# Patient Record
Sex: FEMALE | Race: WHITE | Employment: FULL TIME | URBAN - METROPOLITAN AREA
[De-identification: names, ages, dates, MRNs, and addresses within clinical notes are randomized per-mention and may not be internally consistent; named-entity substitution may affect disease eponyms.]

---

## 2017-02-14 ENCOUNTER — TELEPHONE (OUTPATIENT)
Dept: FAMILY MEDICINE | Facility: CLINIC | Age: 37
End: 2017-02-14

## 2017-02-14 NOTE — TELEPHONE ENCOUNTER
Summary:    Patient is due/failing the following:   FOLLOW UP    Action needed:   Patient needs office visit for diabetic follow up .    Type of outreach:    phone no answer or voicemail  Reminder letter sent.  Questions for provider review:    None                                                                                                                                    Breana Santamaria       Chart routed to Care Team .        Panel Management Review      Patient has the following on her problem list:     Diabetes    ASA:     Last A1C  Lab Results   Component Value Date    A1C 8.6 12/21/2016    A1C 7.1 07/01/2016    A1C 7.9 04/05/2016    A1C 6.2 09/30/2015    A1C 6.4 06/12/2015     A1C tested: Failed    Last LDL:    Lab Results   Component Value Date    CHOL 152 07/01/2016     Lab Results   Component Value Date    HDL 43 07/01/2016     Lab Results   Component Value Date    LDL 71 07/01/2016     Lab Results   Component Value Date    TRIG 188 07/01/2016     Lab Results   Component Value Date    CHOLHDLRATIO 3.7 09/30/2015     Lab Results   Component Value Date    NHDL 109 07/01/2016       Is the patient on a Statin? YES             Is the patient on Aspirin? NO    Medications     HMG CoA Reductase Inhibitors    simvastatin (ZOCOR) 40 MG tablet          Last three blood pressure readings:  BP Readings from Last 3 Encounters:   12/21/16 116/72   10/14/16 116/76   09/06/16 119/81            Tobacco History:     History   Smoking Status     Former Smoker     Years: 1.00     Types: Cigarettes     Start date: 1/1/1989     Quit date: 1/1/1990   Smokeless Tobacco     Never Used           Composite cancer screening  Chart review shows that this patient is due/due soon for the following None

## 2017-02-14 NOTE — LETTER
[facility]  [address], [address]  [place] [place] [address]  Phone: [phone]  Fax: [phone]  February 14, 2017      Mark Quick  [address] [address]      Dear Mark,    We care about your health and have reviewed your health plan including your medical conditions, medications, and lab results.  Based on this review, it is recommended that you follow up regarding the following health topic(s):  -Diabetes    We recommend you take the following action(s):  -schedule a FOLLOWUP APPOINTMENT.     Please call us at the [facility] - [phone] (or use [place]) to address the above recommendations.     Thank you for trusting [facility] and we appreciate the opportunity to serve you.  We look forward to supporting your healthcare needs in the future.    Healthy Regards,    Your Health Care Team  [facility]

## 2017-02-20 ENCOUNTER — TELEPHONE (OUTPATIENT)
Dept: FAMILY MEDICINE | Facility: CLINIC | Age: 37
End: 2017-02-20

## 2017-02-20 DIAGNOSIS — E11.9 TYPE 2 DIABETES MELLITUS WITH HEMOGLOBIN A1C GOAL OF LESS THAN 7.0% (H): Chronic | ICD-10-CM

## 2017-02-20 NOTE — TELEPHONE ENCOUNTER
Trulicity 1.5mg         Last Written Prescription Date: 12/15/2016  Last Fill Quantity: 2ml, # refills: 1  Last Office Visit with G, P or [facility] prescribing provider:  12/21/2016        BP Readings from Last 3 Encounters:   12/21/16 116/72   10/14/16 116/76   09/06/16 119/81     Lab Results   Component Value Date    MICROL 6 04/05/2016     No results found for: MICROALBUMIN  Creatinine   Date Value Ref Range Status   07/01/2016 0.64 0.52 - 1.04 mg/dL Final   ]  GFR Estimate   Date Value Ref Range Status   07/01/2016 >90  Non  GFR Calc   >60 mL/min/1.7m2 Final   05/20/2016 >60 ml/min/1.73m2 Final   04/05/2016 >90  Non  GFR Calc   >60 mL/min/1.7m2 Final     GFR Estimate If Black   Date Value Ref Range Status   07/01/2016 >90   GFR Calc   >60 mL/min/1.7m2 Final   05/20/2016 >60 ml/min/1.73m2 Final   04/05/2016 >90   GFR Calc   >60 mL/min/1.7m2 Final     Lab Results   Component Value Date    CHOL 152 07/01/2016     Lab Results   Component Value Date    HDL 43 07/01/2016     Lab Results   Component Value Date    LDL 71 07/01/2016     Lab Results   Component Value Date    TRIG 188 07/01/2016     Lab Results   Component Value Date    CHOLHDLRATIO 3.7 09/30/2015     Lab Results   Component Value Date    AST 17 07/01/2016     Lab Results   Component Value Date    ALT 23 07/01/2016     Lab Results   Component Value Date    A1C 8.6 12/21/2016    A1C 7.1 07/01/2016    A1C 7.9 04/05/2016    A1C 6.2 09/30/2015    A1C 6.4 06/12/2015     Potassium   Date Value Ref Range Status   07/01/2016 4.0 3.4 - 5.3 mmol/L Final

## 2017-02-21 RX ORDER — DULAGLUTIDE 1.5 MG/.5ML
INJECTION, SOLUTION SUBCUTANEOUS
Qty: 2 ML | Refills: 1 | Status: SHIPPED | OUTPATIENT
Start: 2017-02-21 | End: 2017-05-08

## 2017-03-09 ENCOUNTER — TELEPHONE (OUTPATIENT)
Dept: FAMILY MEDICINE | Facility: CLINIC | Age: 37
End: 2017-03-09

## 2017-03-09 NOTE — TELEPHONE ENCOUNTER
Summary:    Patient is due/failing the following:   Eye and foot exam, A1C and FOLLOW UP    Action needed:   Patient needs office visit for diabetic follow up. and Patient needs non-fasting lab only appointment    Type of outreach:    Phone, left message for patient to call back.     Questions for provider review:    None                                                                                                                                    Breana Mary Bethayla       Chart routed to Care Team .        Panel Management Review      Patient has the following on her problem list:     Diabetes    ASA:     Last A1C  Lab Results   Component Value Date    A1C 8.6 12/21/2016    A1C 7.1 07/01/2016    A1C 7.9 04/05/2016    A1C 6.2 09/30/2015    A1C 6.4 06/12/2015     A1C tested: FAILED    Last LDL:    Lab Results   Component Value Date    CHOL 152 07/01/2016     Lab Results   Component Value Date    HDL 43 07/01/2016     Lab Results   Component Value Date    LDL 71 07/01/2016     Lab Results   Component Value Date    TRIG 188 07/01/2016     Lab Results   Component Value Date    CHOLHDLRATIO 3.7 09/30/2015     Lab Results   Component Value Date    NHDL 109 07/01/2016       Is the patient on a Statin? YES             Is the patient on Aspirin? NO    Medications     HMG CoA Reductase Inhibitors    simvastatin (ZOCOR) 40 MG tablet          Last three blood pressure readings:  BP Readings from Last 3 Encounters:   12/21/16 116/72   10/14/16 116/76   09/06/16 119/81            Tobacco History:     History   Smoking Status     Former Smoker     Years: 1.00     Types: Cigarettes     Start date: 1/1/1989     Quit date: 1/1/1990   Smokeless Tobacco     Never Used           Composite cancer screening  Chart review shows that this patient is due/due soon for the following None

## 2017-03-09 NOTE — LETTER
[facility]  [address]  [place] [address]  Phone: [phone]  Fax: [phone]  April 4, 2017      Mark Quick  [address] [address] [address]      Dear Mark,    We care about your health and have reviewed your health plan including your medical conditions, medications, and lab results.  Based on this review, it is recommended that you follow up regarding the following health topic(s):  -Diabetes    We recommend you take the following action(s):  -schedule a FOLLOWUP OFFICE APPOINTMENT.  We will perform the following labs:  A1c.     Please call us at the [facility] - [phone] (or use [facility]: [place]) to address the above recommendations.     Thank you for trusting [facility] and we appreciate the opportunity to serve you.  We look forward to supporting your healthcare needs in the future.    Healthy Regards,    Your Health Care Team  [facility]

## 2017-03-28 ENCOUNTER — OFFICE VISIT (OUTPATIENT)
Dept: PODIATRY | Facility: OTHER | Age: 37
End: 2017-03-28
Payer: COMMERCIAL

## 2017-03-28 VITALS — HEIGHT: 60 IN | WEIGHT: 205 LBS | BODY MASS INDEX: 40.25 KG/M2 | TEMPERATURE: 98.5 F

## 2017-03-28 DIAGNOSIS — E11.9 TYPE 2 DIABETES MELLITUS WITH HEMOGLOBIN A1C GOAL OF LESS THAN 7.0% (H): Chronic | ICD-10-CM

## 2017-03-28 DIAGNOSIS — M72.2 PLANTAR FASCIAL FIBROMATOSIS: Primary | ICD-10-CM

## 2017-03-28 PROCEDURE — 99204 OFFICE O/P NEW MOD 45 MIN: CPT | Performed by: PODIATRIST

## 2017-03-28 RX ORDER — DICLOFENAC SODIUM 75 MG/1
75 TABLET, DELAYED RELEASE ORAL 2 TIMES DAILY
Qty: 60 TABLET | Refills: 1 | Status: SHIPPED | OUTPATIENT
Start: 2017-03-28 | End: 2017-05-08

## 2017-03-28 ASSESSMENT — PAIN SCALES - GENERAL: PAINLEVEL: MILD PAIN (3)

## 2017-03-28 NOTE — LETTER
3/28/2017       RE: Mark Quick  [address] [address] [facility] [address]           Dear Colleague,    Thank you for referring your patient, Mark Quick, to the [facility]. Please see a copy of my visit note below.    HPI:  Mark Quick is a 36 year old female who is seen in consultation at the request of self.    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     ROS: 10 point ROS neg other than the symptoms noted above in the HPI.    PAST MEDICAL HISTORY:   Past Medical History:   Diagnosis Date     Gestational diabetes , ,      History of blood transfusion     I was given blood and plasma after giving birth     Preeclampsia, severe     elevated LFTs     PAST SURGICAL HISTORY:   Past Surgical History:   Procedure Laterality Date      SECTION  , , ,      TONSILLECTOMY       MEDICATIONS:   Current Outpatient Prescriptions:      diclofenac (VOLTAREN) 75 MG EC tablet, Take 1 tablet (75 mg) by mouth 2 times daily, Disp: 60 tablet, Rfl: 1     TRULICITY 1.5 MG/0.5ML pen, INJECT 1.5 MG UNDER THE SKIN EVERY 7 DAYS, Disp: 2 mL, Rfl: 1     Pediatric Multivit-Minerals-C (FLINTSTONES COMPLETE PO), , Disp: , Rfl:      Calcium Citrate-Vitamin D (CALCIUM + D PO), , Disp: , Rfl:      nystatin-triamcinolone (MYCOLOG II) cream, Apply to affected area BID up to 7 days prn rash, Disp: 30 g, Rfl: 3     blood glucose monitoring (NO BRAND SPECIFIED) test strip, Use to test blood sugar four times daily or as directed. One touch  ultra mini system, Disp: 12 Box, Rfl: 4     citalopram (CELEXA) 40 MG tablet, Take 1 tablet (40 mg) by mouth daily, Disp: 90 tablet, Rfl: 3     simvastatin (ZOCOR) 40 MG tablet, Take 1 tablet (40 mg) by mouth At Bedtime, Disp: 90 tablet, Rfl: 3     ASPIRIN NOT PRESCRIBED (INTENTIONAL), continuous prn for other Antiplatelet medication not prescribed intentionally due to Not indicated based on age, Disp: , Rfl:      lisinopril (PRINIVIL,ZESTRIL) 5 MG tablet, Take 1 tablet (5 mg) by mouth daily, Disp: 90 tablet, Rfl: 3     blood glucose meter (NO BRAND SPECIFIED) meter device kit, Use to test blood sugar four times daily or as directed.  Get meter that is covered by insurance, Disp: 1 kit, Rfl: 0     IBUPROFEN PO, , Disp: , Rfl:   ALLERGIES:    Allergies   Allergen Reactions     Hydrocodone Nausea     If taking Zofran she can take it.       Percocet [Oxycodone-Acetaminophen] Nausea     If using zofran she can tolerate but prefers not to.       SOCIAL HISTORY:   Social History     Social History     Marital status:      Spouse name: Jeff     Number of children: 4     Years of education: 13     Occupational History     [employer] and  [employer]     Social History Main Topics     Smoking status: Former Smoker     Years: 1.00     Types: Cigarettes     Start date: 1/1/1989     Quit date: 1/1/1990     Smokeless tobacco: Never Used     Alcohol use Yes      Comment: Very infrequent     Drug use: No     Sexual activity: Yes     Partners: Male     Birth control/ protection: Female Surgical      Comment: tubal     Other Topics Concern     Parent/Sibling W/ Cabg, Mi Or Angioplasty Before 65f 55m? No     Social History Narrative     FAMILY HISTORY:   Family History   Problem Relation Age of Onset     DIABETES Mother      DIABETES Maternal Grandmother      HEART DISEASE Maternal Grandfather      Asthma No family hx of      C.A.D. No family hx of      Hypertension No family hx of      CEREBROVASCULAR DISEASE No family hx of   "    Breast Cancer No family hx of      Cancer - colorectal No family hx of      Prostate Cancer No family hx of      Alcohol/Drug No family hx of      Allergies No family hx of      Alzheimer Disease No family hx of      Anesthesia Reaction No family hx of      Arthritis No family hx of      Blood Disease No family hx of      CANCER No family hx of      Cardiovascular No family hx of      Circulatory No family hx of      Congenital Anomalies No family hx of      Connective Tissue Disorder No family hx of      Depression No family hx of      Endocrine Disease No family hx of      Eye Disorder No family hx of      Coronary Artery Disease No family hx of      Hyperlipidemia No family hx of      Ovarian Cancer No family hx of      Depression/Anxiety No family hx of      Thyroid Disease No family hx of      OSTEOPOROSIS No family hx of      Chemical Addiction No family hx of      Known Genetic Syndrome No family hx of      MENTAL ILLNESS No family hx of      Anxiety Disorder No family hx of        EXAM:Vitals: Temp 98.5  F (36.9  C) (Temporal)  Ht 5' 0.43\" (1.535 m)  Wt 205 lb (93 kg)  BMI 39.46 kg/m2  BMI= Body mass index is 39.46 kg/(m^2).    General appearance: Patient is alert and fully cooperative with history & exam.  No sign of distress is noted during the visit.     Psychiatric: Affect is pleasant & appropriate.  Patient appears motivated to improve health.     Respiratory: Breathing is regular & unlabored while sitting.     HEENT: Hearing is intact to spoken word.  Speech is clear.  No gross evidence of visual impairment that would impact ambulation.     Vascular: DP & PT 2/4 & regular bilaterally.  No significant edema, rubor or varicosities noted.  CFT and skin temperature is normal to both lower extremities.       Neurologic: Lower extremity sensation is intact to light touch.  No evidence of weakness in the lower extremities.  Protective threshold is intact +10/10 applications of a 5.0 7 monofilament.   "   Dermatologic: Skin is intact to both lower extremities without significant lesions, rash or abrasion.  Normal texture turgor and tone. No paronychia or evidence of soft tissue infection is noted.    Musculoskeletal: Patient is ambulatory without assistive device or brace. Pain is noted with firm palpation along the medial band of the plantar fascia bilateral foot most notably at the origination upon the calcaneus not through the arch.  No pain with compression of the calcaneus medial to lateral or with palpation of the achilles, peroneal or posterior tibial tendons.  Slightly more than 0  of ankle joint dorsiflexion without crepitus or pain throughout the ankle, subtalar or midtarsal joints.  No pain or limitations throughout manual muscle strength testing plus 5/5 to all four quadrants bilateral.  No palpable edema noted.      Radiographs:  Osteophyte noted about the plantar calcaneus consistent with plantar fasciitis.    Hemoglobin A1C (%)   Date Value   12/21/2016 8.6 (H)   07/01/2016 7.1 (H)   04/05/2016 7.9 (H)   09/30/2015 6.2 (H)   06/12/2015 6.4 (H)   01/29/2015 6.0   11/04/2014 8.7 (H)   11/30/2011 5.3     Creatinine (mg/dL)   Date Value   07/01/2016 0.64   05/20/2016 0.69   04/05/2016 0.58   09/30/2015 0.65   06/12/2015 0.89   01/29/2015 0.74          ASSESSMENT:       ICD-10-CM    1. Plantar fascial fibromatosis M72.2 ORTHOTICS REFERRAL     diclofenac (VOLTAREN) 75 MG EC tablet   2. Type 2 diabetes mellitus with hemoglobin A1c goal of less than 7.0% (H) E11.9 ORTHOTICS REFERRAL     diclofenac (VOLTAREN) 75 MG EC tablet       PLAN:  Reviewed patient's chart in [facility] and discussed etiology and treatment options.      Treatments:  3/28/2017  Discontinue barefoot walking or unsupported walking in shoes without shank.  Dispensed written instructions to obtain appropriate shoe gear and/or OTC inserts.  Dispensed anterior night splint to use all night every night.  Prescription oral Voltaren for a short course.  Discussed risks.  Prescription for custom molded orthotics 3/28/2017  Follow up in 4-5 weeks     Also discussed uncontrolled diabetes. We discussed how elevated blood glucose contributes to chronic collagen cross-linking and increased risk of such things related to plantar fasciitis. I recommen recent reduced control of diabetes. She is also newly diagnosed in the last couple of years and will benefit from more aggressive education and understanding as well as medication adjustments. ded that she follow-up with her primary care provider for more aggressive management of blood glucose. Written instructions regarding lower extremity risk factors associated with diabetes also dispensed.    In addition to the above history and physical exam, approximately 45 minutes of time was spent with the patient, greater than 50% directly with the patient, counseling, educating, and coordinating care in regards to the above noted multiple diagnoses.     Karl Basilio DPM      Again, thank you for allowing me to participate in the care of your patient.        Sincerely,              Karl Basilio DPM

## 2017-03-28 NOTE — MR AVS SNAPSHOT
After Visit Summary   3/28/2017    Mark Quick    MRN: [id number]           Patient Information     Date Of Birth          1980        Visit Information        Provider Department      3/28/2017 11:00 AM Karl Basilio, NIR [facility]'s Diagnoses     Plantar fascial fibromatosis    -  1    Type 2 diabetes mellitus with hemoglobin A1c goal of less than 7.0% (H)          Care Instructions      Reliable shoe stores: To maximize your experience and provide the best possible fit.  Be sure to show them your foot concerns and tell them Dr. Basilio sent you.      Stores listed in bold have only athletic shoes, and stores that are not bold are mostly casual or variety of shoes    [employer]  [address] [place] [address]  [phone]    [employer]  [address] [place] [address]  [phone]    [employer]  [address] [place] [address]  [phone]    [employer]  [address] [facility] [address]  [phone]    [employer]  [address] [place], [place] [address] [phone]    [employer]  [address] [place] [address]  [phone]                         [employer] Locations:     [address] [place] [address]   [phone]     [address] [place] [address]   [phone]     [address] [place] [address]   [phone]     [address] [place] [address]   [phone]     [address] [place] [address]   [phone]     [address] [facility] [place] [address]   [phone]     [address] [facility] [address] [facility] [address]   [place] [address]   [phone]     [address]   [place], [place] [address] [phone]             How to find reasonable shoes          The correct width    Correct Fitting    Correct Length      Foot Distortion    Posture Distortion                           Torsional Rigidity      Grasp behind the heel and underneath the foot and twist      Bad    Excessive torsion/twist in midfoot     Less torsion/twist in midfoot is better                   Heel Counter Rigidity      Grasp just above   midsole and squeeze      Bad    Soft heel counter      Good    Rigid Heel Counter      Flexion Rigidity      Grasp shoe and bend from forefoot to rearfoot                    PLANTAR FASCIITIS  The  plantar fascia  is a thick fibrous layer of tissue that covers the bones on the bottom of your foot. It supports the foot bones in an arched position.  Plantar fasciitis  is a painful inflammation of the plantar fascia due to overuse. This can develop gradually or suddenly. It usually affects one foot at a time but can affect both feet. Heel pain can be sharp and feel like a knife sticking in the bottom of your foot. Pain may occur after exercising, long distance jogging, stair climbing, long periods of standing, or after getting up from a seated position.  Risk factors include arthritis, diabetes, obesity or recent weight gain, flat-foot, high arch, wearing high heels or loose shoes or shoes with poor arch support.  Sudden changes in activity or shoe gear may contribute to symptoms.  Foot pain from this condition is usually worse in the morning and improves with walking. By the end of the day there may be a dull aching. Treatment requires improved support of feet, short-term rest and controlling inflammation. It may take up to nine months before all symptoms go away with the measures described below.  A steroid injection into the foot, or surgery may be needed if this is becomes long standing or severe.  HOME CARE  1. If you are overweight, lose weight to promote healing.  2. Choose supportive shoes (stiff through the shank) with good arch support and shock absorbency. Replace athletic shoes when they become worn out. Don t walk or run barefoot.  3. Shoe inserts are an important part of  treatment. You can buy off-the-shelf shoe inserts inexpensively such as Superfeet.  The best ones are custom molded to your foot with a prescription.  4. Night splints keep the plantar fascia gently stretched while you sleep and will eliminate morning pain. Wear it ALL NIGHT EVERY NIGHT, or any time you sit for a long time.  5. Reduce by 10% or more the activities that stress the feet: jogging, prolonged standing or walking, high impact sports, etc.  6. Stretch your feet. Gently flex your ankle by leaning into a wall or counter or drop your heel from a step.  Stretch two minutes of every hour you are awake.  7. Icing or massage may help heel pain. Apply an ice pack or frozen water or Coke bottle to the heel for 10-20 minutes as a preventive or after an acute flare of symptoms. You may repeat this as needed.   Follow up with your Doctor in 3 weeks as instructed.      DIABETES AND YOUR FEET    What effect does diabetes have on the feet?  Diabetes can result in several problems in the feet including contractures of the tendons leading to deformities and reduced function of the bones, skin ulcers or open sores on pressure points or prominent deformities, reduced sensation, reduced blood flow and thus reduced oxygen and immune cells to the tiny vessels in our feet. This all leads to higher risk of hospitalization, infections, and amputations.     What is neuropathy?  Neuropathy is a term used to describe a loss of nerve function.  Patients with diabetes are at risk of developing neuropathy if their sugars continue to run high and are above the normal value of 140.  The elevated blood sugar in the body enters the nerves causing it to swell and impair nerve function.  The higher the blood sugar and the longer it is elevated, the more damage is done to nerves.  This damage is permanent and irreversible.  These damaged sensory nerves can then cause reduced feeling or cause pain.  Damaged motor nerves can reduce blood flow  "and white blood cells into into your foot, skin and bones reducing your ability to heal a small problem. And neuropathy can cause tendons to become unbalanced and contribute to the formation of deformity and contractures in our feet. Often times, neuropathy can be prevented by controlling your blood sugar.  Your risk of developing neuropathy goes up dramatically as your hemoglobin A1C raises above 7.5.      How do I know if I have neuropathy?  When a person develops neuropathy, they usually begin to feel numbness or tingling in their feet and sometimes in their legs.  Other symptoms may include painful burning or hot feet, tingling, electrical sensations or feeling like insects or ants are crawling on your feet or legs.  If blood sugar remains above 140  for long periods of time, neuropathy can also occur in the hands.  When a person loses their \"protective threshold\" or ability to detect a 5.07 [place] monofilament is when they have elevated risk for developing foot deformity, contractures, foot infections, amputations, Charcot arthropathy, or other complications. Keep your hemoglobin A1C below 7.5 to reduce this risk.    What is vascular disease?  Peripheral vascular disease is a term used to describe a loss or decrease in circulation (blood flow).  There is a problem in getting blood, immune cells, and oxygen to areas that need it.  Similar to neuropathy, sugars can build up in the walls of the arteries (blood vessels) and cause them to become swollen, thickened and hardened.  This decreases the amount of blood that can go to an area that needs it.  Though this is common in the legs of diabetic patients, it can also affect other arteries (blood vessels) in the body such as in the heart, kidney, eyes, and the blood flow into bones.  It is often seen first in the small vessels of her body notably our feet and toes.    How do I know if I have vascular disease?  In the legs, vascular disease usually results " in cramping.  Patients who develop leg cramps after walking the same distance every time (i.e. One block, half a mile, ect.) need to let their doctors know so that their circulation may be checked.  Cramps causing severe pain in the feet and/or legs while sleeping and the cramps go away when you stand or hang your legs off the side of the bed, may also be a sign of poor blood circulation.  Occasional cramping in cold weather or on rare occasions with activity may not be due to poor circulation, but you should inform your doctor.    How can these problems be prevented?  The key to prevention is good blood sugar control all day every day.  Inadequate blood sugar control is the most common way patients experience these problems. Reducing, controlling and measuring your daily consumption of sugar or carbohydrates is essential to understanding and managing diabetes.  Physical activity (exercise) is a very good way to help decrease your blood sugars.  Exercise can lower your blood sugar, blood pressure, and cholesterol.  It also reduces your risk for heart disease and stroke, relieves stress, and strengthens your heart, muscles and bones. Physical activity also increases your balance and reduces development of contractures and foot deformities over time. In addition, regular activity helps insulin work better, improves your blood circulation, and keeps your joints flexible.  If you're trying to lose weight, a combination of exercise and wise food choices can help you reach your target weight and maintain it.  Activity and exercise alone can not make up for poor diet choices, eating too much, or eating too many sugars or carbohydrates.  Ask your doctor for help when you are not meeting your blood sugar goals. Changes or increases in medication are powerful tools in reducing your blood sugar.    Know your blood sugar and hemoglobin A1C trend.  Upon first diagnosis or during acute illness, checking your blood sugar 4 times a  day can help you understand how your diet, activity, and lifestyle affect your blood sugar.  Monitoring your hemoglobin A1C can help you understand how well you are managing blood sugar over the long run.  Your hemoglobin A1C tells you what your blood sugar averages all day, every day, over the past 90 days.       To experience the lowest risk of complications associated with diabetes such as neuropathy, loss of blood flow, bone or joint infection, charcot arthropathy, or amputation, the American Diabetes Association recommends a target hemoglobin A1C of less than 7.0%, while the American Association of Clinical Endocrinologists' recommendation is 6.5% or less.  Both organizations advise that the goals be individualized based on patient factors such as other health conditions, history of hypoglycemia, education, and life expectancy.  A patients risk of experiencing complications associated with diabetes is only slightly elevated with a hemoglobin A1C above 6.0.  However, this risk goes up exponentially when the hemoglobin A1C is above 7.5.  The longer the hemoglobin A1C is elevated, the more risk that patient will experience in their lifetime. The damage that occurs to nerves, blood vessels, tendons, bones and body organs, while their hemoglobin A1C is elevated is mostly irreversible and worsens with each additional time period of elevated hemoglobin A1C.     You must understand and manage your disease.  Your health insurance or medical team cannot manage this disease for you.  When you take responsibility for understanding and managing your disease, you can expect to experience fewer problems associated with diabetes in your lifetime.  You will  Also experience a higher quality of life and health and reduced cost of health care.    Diabetic Foot Care Recommendations  The following are recommendations for avoiding serious foot problems or injury    DO'S  1. Be aware of your hemoglobin A1C and continue to follow up  with your medical team for adjustments in your lifestyle and medication until your reach your A1C goal.  Keep this below 7.5 to reduce your risk of developing complications associated with diabetes.    2.  Wash your feet with lukewarm water and a mild soap and then dry them thoroughly, especially between the toes.  Gently floss your towel or washcloth between each toe at every bath.  Soaking your feet in water cannot clean dead skin, debris, and bacteria from your feet and is not necessary.   3. Examine your feet daily looking for cuts, corns, blisters, cracks, ect..., especially after wearing new shoes or increased or changed activities.  Make sure to look between your toes.  If you cannot see the bottom of your feet, set a mirror on the floor and hold your foot over it, or ask a family member to examine your feet for you daily.  Contact your doctor immediately if new problems are noted or if sores are not healing.  4.  Immediately apply moisturizer cream such as Cetaphil to the tops and bottoms of your feet, avoiding areas between the toes.  Apply sunscreen or cover your feet if they will be exposed to extended sunlight.  5.Use clean comfortable shoes.  Socks should not have thick seams or cut off the circulation around the leg.  Break in new shoes slowly and rotate with older shoes until broken in.  Check the inside of your shoes with your hand to look for areas of irritation or objects that may have fallen into your shoes.    6. Keep slippers by the side of your bed for use during the night.  7. Shoes should be fitted by a professional and should not cause areas of irritation.  Check your feet regularly when wearing a new pair of shoes and replace them as needed.  8.  Talk to your doctor about proper exercise.  Exercise and stretching stimulate blood flow to your feet and maintain proper glucose levels.  Use it or lose it!  9.  Monitor your blood glucose level and your hemoglobin A1C.  Notify your doctor  "immediately if your blood sugar is abnormally high or low.  10.  Cut your nails straight across, but then gently round any sharp edges with a nail file.  If you have neuropathy, peripheral vascular disease or cannot see that well to trim your own toenails, see a medical professional for care.    DONT'S  1.  Do not soak your feet if you have an open sore or your provider has informed you that you have neuropathy or loss of protective threshold.  Use only lukewarm water and always check the temperature with your hand as hot water can easily burn your feet.    2.  Never use a hot water bottle or heating pad on your feet.  Also do not apply hot or cold compresses to your feet.  With decreased sensation, you could burn or freeze your feet.  Do not rest your feet near a heat source such as a heater or heat register.    3.  Do not apply any of these to your feet:    - over the counter medicine for corns or warts    -  Harsh chemicals like boric acid    -  Do not self-treat corns, cuts, blisters or infections.  Always consult your doctor.   4.  Do not wear sandals, slippers or walk barefoot, especially on harsh surfaces.  5.  If you smoke, stop!!!            Follow-ups after your visit        Additional Services     ORTHOTICS REFERRAL       [facility] scheduling staff may contact patient to arrange appointments for casting of orthotics and often do not leave messages.  The patient may call this number for scheduling at their convenience. Scheduling Phone [phone].      One pair custom functional foot orthotics.   Flexible polypropylene shell with 1/8\" Spenco cushioned top cover, crepe rearfoot post, medial density arch fill, MIRELLA bottom cover.  Aerobic model.                  Your next 10 appointments already scheduled     Mar 28, 2017  3:30 PM CDT   Weight Loss Visit with Nela Ritter Diet 3, RD   [facility] ([facility])    [address] [place] [address] " "[phone]              Who to contact     If you have questions or need follow up information about today's clinic visit or your schedule please contact [facility] [place] directly at [phone].  Normal or non-critical lab and imaging results will be communicated to you by readeohart, letter or phone within 4 business days after the clinic has received the results. If you do not hear from us within 7 days, please contact the clinic through readeohart or phone. If you have a critical or abnormal lab result, we will notify you by phone as soon as possible.  Submit refill requests through [employer] or call your pharmacy and they will forward the refill request to us. Please allow 3 business days for your refill to be completed.          Additional Information About Your Visit        [employer]ohar[employer] Information     [employer] gives you secure access to your electronic health record. If you see a primary care provider, you can also send messages to your care team and make appointments. If you have questions, please call your primary care clinic.  If you do not have a primary care provider, please call [phone] and they will assist you.        Care EveryWhere ID     This is your Care EveryWhere ID. This could be used by other organizations to access your [place] medical records  [id number]        Your Vitals Were     Temperature Height BMI (Body Mass Index)             98.5  F (36.9  C) (Temporal) 5' 0.43\" (1.535 m) 39.46 kg/m2          Blood Pressure from Last 3 Encounters:   12/21/16 116/72   10/14/16 116/76   09/06/16 119/81    Weight from Last 3 Encounters:   03/28/17 205 lb (93 kg)   12/21/16 210 lb 9.6 oz (95.5 kg)   10/14/16 205 lb 6.4 oz (93.2 kg)              We Performed the Following     ORTHOTICS REFERRAL          Today's Medication Changes          These changes are accurate as of: 3/28/17 11:52 AM.  If you have any questions, ask your nurse or doctor.               Start taking these " medicines.        Dose/Directions    diclofenac 75 MG EC tablet   Commonly known as:  VOLTAREN   Used for:  Plantar fascial fibromatosis, Type 2 diabetes mellitus with hemoglobin A1c goal of less than 7.0% (H)   Started by:  Karl Basilio DPM        Dose:  75 mg   Take 1 tablet (75 mg) by mouth 2 times daily   Quantity:  60 tablet   Refills:  1            Where to get your medicines      These medications were sent to [place] [address] [place] [facility] - [address]  [address] [facility] [address]     Phone:  [phone]     diclofenac 75 MG EC tablet                Primary Care Provider Office Phone # Fax #    Adriana OBEY [facility] [phone]       [facility] [address] [facility] [address]        Thank you!     Thank you for choosing [facility]  for your care. Our goal is always to provide you with excellent care. Hearing back from our patients is one way we can continue to improve our services. Please take a few minutes to complete the written survey that you may receive in the mail after your visit with us. Thank you!             Your Updated Medication List - Protect others around you: Learn how to safely use, store and throw away your medicines at www.disposemymeds.org.          This list is accurate as of: 3/28/17 11:52 AM.  Always use your most recent med list.                   Brand Name Dispense Instructions for use    ASPIRIN NOT PRESCRIBED    INTENTIONAL     continuous prn for other Antiplatelet medication not prescribed intentionally due to Not indicated based on age       blood glucose monitoring meter device kit    no brand specified    1 kit    Use to test blood sugar four times daily or as directed.  Get meter that is covered by insurance       blood glucose monitoring test strip    no brand specified    12 Box    Use to test blood sugar four times daily or as directed. One touch ultra mini system       CALCIUM + D PO           citalopram 40 MG tablet    celeXA    90 tablet    Take 1 tablet (40 mg) by mouth daily       diclofenac 75 MG EC tablet    VOLTAREN    60 tablet    Take 1 tablet (75 mg) by mouth 2 times daily       FLINTSTONES COMPLETE PO          IBUPROFEN PO          lisinopril 5 MG tablet    PRINIVIL/ZESTRIL    90 tablet    Take 1 tablet (5 mg) by mouth daily       nystatin-triamcinolone cream    MYCOLOG II    30 g    Apply to affected area BID up to 7 days prn rash       simvastatin 40 MG tablet    ZOCOR    90 tablet    Take 1 tablet (40 mg) by mouth At Bedtime       TRULICITY 1.5 MG/0.5ML pen   Generic drug:  dulaglutide     2 mL    INJECT 1.5 MG UNDER THE SKIN EVERY 7 DAYS

## 2017-03-28 NOTE — NURSING NOTE
"Chief Complaint   Patient presents with     Consult     DM - exam, B/L heel pain > Right, onset early 2016; new patient     Diabetes     Type 2       Initial Temp 98.5  F (36.9  C) (Temporal)  Ht 5' 0.43\" (1.535 m)  Wt 205 lb (93 kg)  BMI 39.46 kg/m2 Estimated body mass index is 39.46 kg/(m^2) as calculated from the following:    Height as of this encounter: 5' 0.43\" (1.535 m).    Weight as of this encounter: 205 lb (93 kg).  BP completed using cuff size: NA (Not Taken)  Medication Reconciliation: complete    Virginia Reyna CMA, March 28, 2017    "

## 2017-03-28 NOTE — PATIENT INSTRUCTIONS
Reliable shoe stores: To maximize your experience and provide the best possible fit.  Be sure to show them your foot concerns and tell them Dr. Basilio sent you.      Stores listed in bold have only athletic shoes, and stores that are not bold are mostly casual or variety of shoes    [employer]  [address] [place] [address]  [phone]    [facility] [employer] [facility]  [address] [place] [address]  [phone]     [employer] [facility]  [address] [place] [address]  [phone]    [employer]  [address] [facility]  [facility] [address]  [phone]    [employer]  [address] [place], [place] [address] [phone]    [employer]  [address] [place] [address]  [phone]                         [employer] Locations:     [address] [place] [address]   [phone]     [address] [place] [address] [address] [place] [address]   [phone]     [address] [place] [address]   [phone]     [address] [facility] [place] [address]   [phone]     [address] [place] [address]   [phone]     [address] [place] [address]   [phone]     [address] [facility] [address]   [place] [address]   [phone]     [address] [facility] [address]   [place] [address]   [phone]             How to find reasonable shoes          The correct width    Correct Fitting    Correct Length      Foot Distortion    Posture Distortion                          Torsional Rigidity      Grasp behind the heel and underneath the foot and twist      Bad    Excessive torsion/twist in midfoot     Less torsion/twist in midfoot is better                   Heel Counter Rigidity      Grasp just above   midsole and squeeze      Bad    Soft heel counter      Good    Rigid Heel Counter      Flexion Rigidity      Grasp shoe and bend from forefoot to rearfoot                    PLANTAR FASCIITIS  The  plantar fascia   is a thick fibrous layer of tissue that covers the bones on the bottom of your foot. It supports the foot bones in an arched position.  Plantar fasciitis  is a painful inflammation of the plantar fascia due to overuse. This can develop gradually or suddenly. It usually affects one foot at a time but can affect both feet. Heel pain can be sharp and feel like a knife sticking in the bottom of your foot. Pain may occur after exercising, long distance jogging, stair climbing, long periods of standing, or after getting up from a seated position.  Risk factors include arthritis, diabetes, obesity or recent weight gain, flat-foot, high arch, wearing high heels or loose shoes or shoes with poor arch support.  Sudden changes in activity or shoe gear may contribute to symptoms.  Foot pain from this condition is usually worse in the morning and improves with walking. By the end of the day there may be a dull aching. Treatment requires improved support of feet, short-term rest and controlling inflammation. It may take up to nine months before all symptoms go away with the measures described below.  A steroid injection into the foot, or surgery may be needed if this is becomes long standing or severe.  HOME CARE  1. If you are overweight, lose weight to promote healing.  2. Choose supportive shoes (stiff through the shank) with good arch support and shock absorbency. Replace athletic shoes when they become worn out. Don t walk or run barefoot.  3. Shoe inserts are an important part of treatment. You can buy off-the-shelf shoe inserts inexpensively such as [employer].  The best ones are custom molded to your foot with a prescription.  4. Night splints keep the plantar fascia gently stretched while you sleep and will eliminate morning pain. Wear it ALL NIGHT EVERY NIGHT, or any time you sit for a long time.  5. Reduce by 10% or more the activities that stress the feet: jogging, prolonged standing or walking, high impact sports,  etc.  6. Stretch your feet. Gently flex your ankle by leaning into a wall or counter or drop your heel from a step.  Stretch two minutes of every hour you are awake.  7. Icing or massage may help heel pain. Apply an ice pack or frozen water or Coke bottle to the heel for 10-20 minutes as a preventive or after an acute flare of symptoms. You may repeat this as needed.   Follow up with your Doctor in 3 weeks as instructed.      DIABETES AND YOUR FEET    What effect does diabetes have on the feet?  Diabetes can result in several problems in the feet including contractures of the tendons leading to deformities and reduced function of the bones, skin ulcers or open sores on pressure points or prominent deformities, reduced sensation, reduced blood flow and thus reduced oxygen and immune cells to the tiny vessels in our feet. This all leads to higher risk of hospitalization, infections, and amputations.     What is neuropathy?  Neuropathy is a term used to describe a loss of nerve function.  Patients with diabetes are at risk of developing neuropathy if their sugars continue to run high and are above the normal value of 140.  The elevated blood sugar in the body enters the nerves causing it to swell and impair nerve function.  The higher the blood sugar and the longer it is elevated, the more damage is done to nerves.  This damage is permanent and irreversible.  These damaged sensory nerves can then cause reduced feeling or cause pain.  Damaged motor nerves can reduce blood flow and white blood cells into into your foot, skin and bones reducing your ability to heal a small problem. And neuropathy can cause tendons to become unbalanced and contribute to the formation of deformity and contractures in our feet. Often times, neuropathy can be prevented by controlling your blood sugar.  Your risk of developing neuropathy goes up dramatically as your hemoglobin A1C raises above 7.5.      How do I know if I have neuropathy?   "When a person develops neuropathy, they usually begin to feel numbness or tingling in their feet and sometimes in their legs.  Other symptoms may include painful burning or hot feet, tingling, electrical sensations or feeling like insects or ants are crawling on your feet or legs.  If blood sugar remains above 140  for long periods of time, neuropathy can also occur in the hands.  When a person loses their \"protective threshold\" or ability to detect a 5.07 [place] [employer] monofilament is when they have elevated risk for developing foot deformity, contractures, foot infections, amputations, Charcot arthropathy, or other complications. Keep your hemoglobin A1C below 7.5 to reduce this risk.    What is vascular disease?  Peripheral vascular disease is a term used to describe a loss or decrease in circulation (blood flow).  There is a problem in getting blood, immune cells, and oxygen to areas that need it.  Similar to neuropathy, sugars can build up in the walls of the arteries (blood vessels) and cause them to become swollen, thickened and hardened.  This decreases the amount of blood that can go to an area that needs it.  Though this is common in the legs of diabetic patients, it can also affect other arteries (blood vessels) in the body such as in the heart, kidney, eyes, and the blood flow into bones.  It is often seen first in the small vessels of her body notably our feet and toes.    How do I know if I have vascular disease?  In the legs, vascular disease usually results in cramping.  Patients who develop leg cramps after walking the same distance every time (i.e. One block, half a mile, ect.) need to let their doctors know so that their circulation may be checked.  Cramps causing severe pain in the feet and/or legs while sleeping and the cramps go away when you stand or hang your legs off the side of the bed, may also be a sign of poor blood circulation.  Occasional cramping in cold weather or on rare " occasions with activity may not be due to poor circulation, but you should inform your doctor.    How can these problems be prevented?  The key to prevention is good blood sugar control all day every day.  Inadequate blood sugar control is the most common way patients experience these problems. Reducing, controlling and measuring your daily consumption of sugar or carbohydrates is essential to understanding and managing diabetes.  Physical activity (exercise) is a very good way to help decrease your blood sugars.  Exercise can lower your blood sugar, blood pressure, and cholesterol.  It also reduces your risk for heart disease and stroke, relieves stress, and strengthens your heart, muscles and bones. Physical activity also increases your balance and reduces development of contractures and foot deformities over time. In addition, regular activity helps insulin work better, improves your blood circulation, and keeps your joints flexible.  If you're trying to lose weight, a combination of exercise and wise food choices can help you reach your target weight and maintain it.  Activity and exercise alone can not make up for poor diet choices, eating too much, or eating too many sugars or carbohydrates.  Ask your doctor for help when you are not meeting your blood sugar goals. Changes or increases in medication are powerful tools in reducing your blood sugar.    Know your blood sugar and hemoglobin A1C trend.  Upon first diagnosis or during acute illness, checking your blood sugar 4 times a day can help you understand how your diet, activity, and lifestyle affect your blood sugar.  Monitoring your hemoglobin A1C can help you understand how well you are managing blood sugar over the long run.  Your hemoglobin A1C tells you what your blood sugar averages all day, every day, over the past 90 days.       To experience the lowest risk of complications associated with diabetes such as neuropathy, loss of blood flow, bone or  joint infection, charcot arthropathy, or amputation, the American Diabetes Association recommends a target hemoglobin A1C of less than 7.0%, while the American Association of Clinical Endocrinologists' recommendation is 6.5% or less.  Both organizations advise that the goals be individualized based on patient factors such as other health conditions, history of hypoglycemia, education, and life expectancy.  A patients risk of experiencing complications associated with diabetes is only slightly elevated with a hemoglobin A1C above 6.0.  However, this risk goes up exponentially when the hemoglobin A1C is above 7.5.  The longer the hemoglobin A1C is elevated, the more risk that patient will experience in their lifetime. The damage that occurs to nerves, blood vessels, tendons, bones and body organs, while their hemoglobin A1C is elevated is mostly irreversible and worsens with each additional time period of elevated hemoglobin A1C.     You must understand and manage your disease.  Your health insurance or medical team cannot manage this disease for you.  When you take responsibility for understanding and managing your disease, you can expect to experience fewer problems associated with diabetes in your lifetime.  You will  Also experience a higher quality of life and health and reduced cost of health care.    Diabetic Foot Care Recommendations  The following are recommendations for avoiding serious foot problems or injury    DO'S  1. Be aware of your hemoglobin A1C and continue to follow up with your medical team for adjustments in your lifestyle and medication until your reach your A1C goal.  Keep this below 7.5 to reduce your risk of developing complications associated with diabetes.    2.  Wash your feet with lukewarm water and a mild soap and then dry them thoroughly, especially between the toes.  Gently floss your towel or washcloth between each toe at every bath.  Soaking your feet in water cannot clean dead skin,  debris, and bacteria from your feet and is not necessary.   3. Examine your feet daily looking for cuts, corns, blisters, cracks, ect..., especially after wearing new shoes or increased or changed activities.  Make sure to look between your toes.  If you cannot see the bottom of your feet, set a mirror on the floor and hold your foot over it, or ask a family member to examine your feet for you daily.  Contact your doctor immediately if new problems are noted or if sores are not healing.  4.  Immediately apply moisturizer cream such as Cetaphil to the tops and bottoms of your feet, avoiding areas between the toes.  Apply sunscreen or cover your feet if they will be exposed to extended sunlight.  5.Use clean comfortable shoes.  Socks should not have thick seams or cut off the circulation around the leg.  Break in new shoes slowly and rotate with older shoes until broken in.  Check the inside of your shoes with your hand to look for areas of irritation or objects that may have fallen into your shoes.    6. Keep slippers by the side of your bed for use during the night.  7. Shoes should be fitted by a professional and should not cause areas of irritation.  Check your feet regularly when wearing a new pair of shoes and replace them as needed.  8.  Talk to your doctor about proper exercise.  Exercise and stretching stimulate blood flow to your feet and maintain proper glucose levels.  Use it or lose it!  9.  Monitor your blood glucose level and your hemoglobin A1C.  Notify your doctor immediately if your blood sugar is abnormally high or low.  10.  Cut your nails straight across, but then gently round any sharp edges with a nail file.  If you have neuropathy, peripheral vascular disease or cannot see that well to trim your own toenails, see a medical professional for care.    DONT'S  1.  Do not soak your feet if you have an open sore or your provider has informed you that you have neuropathy or loss of protective  threshold.  Use only lukewarm water and always check the temperature with your hand as hot water can easily burn your feet.    2.  Never use a hot water bottle or heating pad on your feet.  Also do not apply hot or cold compresses to your feet.  With decreased sensation, you could burn or freeze your feet.  Do not rest your feet near a heat source such as a heater or heat register.    3.  Do not apply any of these to your feet:    - over the counter medicine for corns or warts    -  Harsh chemicals like boric acid    -  Do not self-treat corns, cuts, blisters or infections.  Always consult your doctor.   4.  Do not wear sandals, slippers or walk barefoot, especially on harsh surfaces.  5.  If you smoke, stop!!!

## 2017-03-28 NOTE — NURSING NOTE
Dispensed 1 Dorsal (Anterior) Night Splint, Size S/M, with [facility] agreement signed by patient. Virginia Reyna CMA, March 28, 2017

## 2017-03-28 NOTE — PROGRESS NOTES
HPI:  Mark Quick is a 36 year old female who is seen in consultation at the request of self.    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     ROS: 10 point ROS neg other than the symptoms noted above in the HPI.    PAST MEDICAL HISTORY:   Past Medical History:   Diagnosis Date     Gestational diabetes , ,      History of blood transfusion     I was given blood and plasma after giving birth     Preeclampsia, severe     elevated LFTs     PAST SURGICAL HISTORY:   Past Surgical History:   Procedure Laterality Date      SECTION  , , ,      TONSILLECTOMY       MEDICATIONS:   Current Outpatient Prescriptions:      diclofenac (VOLTAREN) 75 MG EC tablet, Take 1 tablet (75 mg) by mouth 2 times daily, Disp: 60 tablet, Rfl: 1     TRULICITY 1.5 MG/0.5ML pen, INJECT 1.5 MG UNDER THE SKIN EVERY 7 DAYS, Disp: 2 mL, Rfl: 1     Pediatric Multivit-Minerals-C (FLINTSTONES COMPLETE PO), , Disp: , Rfl:      Calcium Citrate-Vitamin D (CALCIUM + D PO), , Disp: , Rfl:      nystatin-triamcinolone (MYCOLOG II) cream, Apply to affected area BID up to 7 days prn rash, Disp: 30 g, Rfl: 3     blood glucose monitoring (NO BRAND SPECIFIED) test strip, Use to test blood sugar four times daily or as directed. One touch ultra mini system, Disp: 12 Box, Rfl: 4     citalopram (CELEXA) 40 MG tablet, Take 1 tablet (40 mg) by mouth daily, Disp: 90 tablet, Rfl: 3     simvastatin (ZOCOR) 40 MG tablet, Take 1 tablet (40 mg) by mouth At Bedtime,  Disp: 90 tablet, Rfl: 3     ASPIRIN NOT PRESCRIBED (INTENTIONAL), continuous prn for other Antiplatelet medication not prescribed intentionally due to Not indicated based on age, Disp: , Rfl:      lisinopril (PRINIVIL,ZESTRIL) 5 MG tablet, Take 1 tablet (5 mg) by mouth daily, Disp: 90 tablet, Rfl: 3     blood glucose meter (NO BRAND SPECIFIED) meter device kit, Use to test blood sugar four times daily or as directed.  Get meter that is covered by insurance, Disp: 1 kit, Rfl: 0     IBUPROFEN PO, , Disp: , Rfl:   ALLERGIES:    Allergies   Allergen Reactions     Hydrocodone Nausea     If taking Zofran she can take it.       Percocet [Oxycodone-Acetaminophen] Nausea     If using zofran she can tolerate but prefers not to.       SOCIAL HISTORY:   Social History     Social History     Marital status:      Spouse name: Jeff     Number of children: 4     Years of education: 13     Occupational History     [employer] and  [employer]     Social History Main Topics     Smoking status: Former Smoker     Years: 1.00     Types: Cigarettes     Start date: 1/1/1989     Quit date: 1/1/1990     Smokeless tobacco: Never Used     Alcohol use Yes      Comment: Very infrequent     Drug use: No     Sexual activity: Yes     Partners: Male     Birth control/ protection: Female Surgical      Comment: tubal     Other Topics Concern     Parent/Sibling W/ Cabg, Mi Or Angioplasty Before 65f 55m? No     Social History Narrative     FAMILY HISTORY:   Family History   Problem Relation Age of Onset     DIABETES Mother      DIABETES Maternal Grandmother      HEART DISEASE Maternal Grandfather      Asthma No family hx of      C.A.D. No family hx of      Hypertension No family hx of      CEREBROVASCULAR DISEASE No family hx of      Breast Cancer No family hx of      Cancer - colorectal No family hx of      Prostate Cancer No family hx of      Alcohol/Drug No family hx of      Allergies No family hx of      Alzheimer Disease No family hx of       "Anesthesia Reaction No family hx of      Arthritis No family hx of      Blood Disease No family hx of      CANCER No family hx of      Cardiovascular No family hx of      Circulatory No family hx of      Congenital Anomalies No family hx of      Connective Tissue Disorder No family hx of      Depression No family hx of      Endocrine Disease No family hx of      Eye Disorder No family hx of      Coronary Artery Disease No family hx of      Hyperlipidemia No family hx of      Ovarian Cancer No family hx of      Depression/Anxiety No family hx of      Thyroid Disease No family hx of      OSTEOPOROSIS No family hx of      Chemical Addiction No family hx of      Known Genetic Syndrome No family hx of      MENTAL ILLNESS No family hx of      Anxiety Disorder No family hx of        EXAM:Vitals: Temp 98.5  F (36.9  C) (Temporal)  Ht 5' 0.43\" (1.535 m)  Wt 205 lb (93 kg)  BMI 39.46 kg/m2  BMI= Body mass index is 39.46 kg/(m^2).    General appearance: Patient is alert and fully cooperative with history & exam.  No sign of distress is noted during the visit.     Psychiatric: Affect is pleasant & appropriate.  Patient appears motivated to improve health.     Respiratory: Breathing is regular & unlabored while sitting.     HEENT: Hearing is intact to spoken word.  Speech is clear.  No gross evidence of visual impairment that would impact ambulation.     Vascular: DP & PT 2/4 & regular bilaterally.  No significant edema, rubor or varicosities noted.  CFT and skin temperature is normal to both lower extremities.       Neurologic: Lower extremity sensation is intact to light touch.  No evidence of weakness in the lower extremities.  Protective threshold is intact +10/10 applications of a 5.0 7 monofilament.     Dermatologic: Skin is intact to both lower extremities without significant lesions, rash or abrasion.  Normal texture turgor and tone. No paronychia or evidence of soft tissue infection is noted.    Musculoskeletal: " Patient is ambulatory without assistive device or brace. Pain is noted with firm palpation along the medial band of the plantar fascia bilateral foot most notably at the origination upon the calcaneus not through the arch.  No pain with compression of the calcaneus medial to lateral or with palpation of the achilles, peroneal or posterior tibial tendons.  Slightly more than 0  of ankle joint dorsiflexion without crepitus or pain throughout the ankle, subtalar or midtarsal joints.  No pain or limitations throughout manual muscle strength testing plus 5/5 to all four quadrants bilateral.  No palpable edema noted.      Radiographs:  Osteophyte noted about the plantar calcaneus consistent with plantar fasciitis.    Hemoglobin A1C (%)   Date Value   12/21/2016 8.6 (H)   07/01/2016 7.1 (H)   04/05/2016 7.9 (H)   09/30/2015 6.2 (H)   06/12/2015 6.4 (H)   01/29/2015 6.0   11/04/2014 8.7 (H)   11/30/2011 5.3     Creatinine (mg/dL)   Date Value   07/01/2016 0.64   05/20/2016 0.69   04/05/2016 0.58   09/30/2015 0.65   06/12/2015 0.89   01/29/2015 0.74          ASSESSMENT:       ICD-10-CM    1. Plantar fascial fibromatosis M72.2 ORTHOTICS REFERRAL     diclofenac (VOLTAREN) 75 MG EC tablet   2. Type 2 diabetes mellitus with hemoglobin A1c goal of less than 7.0% (H) E11.9 ORTHOTICS REFERRAL     diclofenac (VOLTAREN) 75 MG EC tablet       PLAN:  Reviewed patient's chart in [facility] and discussed etiology and treatment options.      Treatments:  3/28/2017  Discontinue barefoot walking or unsupported walking in shoes without shank.  Dispensed written instructions to obtain appropriate shoe gear and/or OTC inserts.  Dispensed anterior night splint to use all night every night.  Prescription oral Voltaren for a short course. Discussed risks.  Prescription for custom molded orthotics 3/28/2017  Follow up in 4-5 weeks     Also discussed uncontrolled diabetes. We discussed how elevated blood glucose contributes to chronic collagen  cross-linking and increased risk of such things related to plantar fasciitis. I recommen recent reduced control of diabetes. She is also newly diagnosed in the last couple of years and will benefit from more aggressive education and understanding as well as medication adjustments. ded that she follow-up with her primary care provider for more aggressive management of blood glucose. Written instructions regarding lower extremity risk factors associated with diabetes also dispensed.    In addition to the above history and physical exam, approximately 45 minutes of time was spent with the patient, greater than 50% directly with the patient, counseling, educating, and coordinating care in regards to the above noted multiple diagnoses.     Karl Basilio DPM

## 2017-04-04 ENCOUNTER — DOCUMENTATION ONLY (OUTPATIENT)
Dept: ORTHOPEDICS | Facility: CLINIC | Age: 37
End: 2017-04-04

## 2017-04-05 ENCOUNTER — TELEPHONE (OUTPATIENT)
Dept: PHARMACY | Facility: CLINIC | Age: 37
End: 2017-04-05

## 2017-04-05 NOTE — TELEPHONE ENCOUNTER
Patient called today to report her blood sugars. She had been off of trulicity for some time because she thought it was causing a cough but then restarted after she had her last A1c drawn and now is tolerating just fine.    Blood sugars 130, 145, 162, 135, 136, 145, 129, 141, 130, 118.    Patient scheduled for repeat A1c 4/6 and MTM visit on 4/12.   Will follow-up further on blood sugars at next visit.    Selma Bashir, Pharm.D, BCACP  Medication Therapy Management Pharmacist

## 2017-04-11 ENCOUNTER — TELEPHONE (OUTPATIENT)
Dept: PHARMACY | Facility: CLINIC | Age: 37
End: 2017-04-11

## 2017-04-11 NOTE — TELEPHONE ENCOUNTER
Called patient as she missed her appointment today. Patient forgot about the appointment today as she got called into work. She will call and reschedule an appointment when she gets home.  Selma Bashir, Pharm.D, [facility]  Medication Therapy Management Pharmacist

## 2017-04-18 NOTE — NURSING NOTE
Dispensed 1 Dorsal (Anterior) Night Splint, Size S/M, with [facility] agreement signed by patient. Virginia Reyna CMA, April 18, 2017

## 2017-04-27 DIAGNOSIS — E11.9 TYPE 2 DIABETES MELLITUS WITH HEMOGLOBIN A1C GOAL OF LESS THAN 7.0% (H): ICD-10-CM

## 2017-04-27 NOTE — TELEPHONE ENCOUNTER
lisinopril      Last Written Prescription Date: 04/05/16  Last Fill Quantity: 90, # refills: 3  Last Office Visit with [facility], [facility] or [facility] prescribing provider: 12/21/16  Next 5 appointments (look out 90 days)     May 02, 2017 11:30 AM CDT   [facility]    [address] [facility] [address]   [phone]                   Potassium   Date Value Ref Range Status   07/01/2016 4.0 3.4 - 5.3 mmol/L Final     Creatinine   Date Value Ref Range Status   07/01/2016 0.64 0.52 - 1.04 mg/dL Final     BP Readings from Last 3 Encounters:   12/21/16 116/72   10/14/16 116/76   09/06/16 119/81

## 2017-04-28 RX ORDER — LISINOPRIL 5 MG/1
TABLET ORAL
Qty: 90 TABLET | Refills: 0 | Status: SHIPPED | OUTPATIENT
Start: 2017-04-28 | End: 2017-09-22

## 2017-04-28 NOTE — TELEPHONE ENCOUNTER
Prescription approved per [facility] Refill Protocol.  Due for labs in July.  Adriana Phillips, RN, BSN

## 2017-05-02 ENCOUNTER — OFFICE VISIT (OUTPATIENT)
Dept: PODIATRY | Facility: OTHER | Age: 37
End: 2017-05-02
Payer: COMMERCIAL

## 2017-05-02 VITALS — HEIGHT: 60 IN | WEIGHT: 205 LBS | TEMPERATURE: 98 F | BODY MASS INDEX: 40.25 KG/M2

## 2017-05-02 DIAGNOSIS — M72.2 PLANTAR FASCIAL FIBROMATOSIS: Primary | ICD-10-CM

## 2017-05-02 PROCEDURE — 99213 OFFICE O/P EST LOW 20 MIN: CPT | Performed by: PODIATRIST

## 2017-05-02 ASSESSMENT — PAIN SCALES - GENERAL: PAINLEVEL: MILD PAIN (2)

## 2017-05-02 NOTE — PATIENT INSTRUCTIONS
PLANTAR FASCIITIS  The  plantar fascia  is a thick fibrous layer of tissue that covers the bones on the bottom of your foot. It supports the foot bones in an arched position.  Plantar fasciitis  is a painful inflammation of the plantar fascia due to overuse. This can develop gradually or suddenly. It usually affects one foot at a time but can affect both feet. Heel pain can be sharp and feel like a knife sticking in the bottom of your foot. Pain may occur after exercising, long distance jogging, stair climbing, long periods of standing, or after getting up from a seated position.  Risk factors include arthritis, diabetes, obesity or recent weight gain, flat-foot, high arch, wearing high heels or loose shoes or shoes with poor arch support.  Sudden changes in activity or shoe gear may contribute to symptoms.  Foot pain from this condition is usually worse in the morning and improves with walking. By the end of the day there may be a dull aching. Treatment requires improved support of feet, short-term rest and controlling inflammation. It may take up to nine months before all symptoms go away with the measures described below.  A steroid injection into the foot, or surgery may be needed if this is becomes long standing or severe.  HOME CARE  1. If you are overweight, lose weight to promote healing.  2. Choose supportive shoes (stiff through the shank) with good arch support and shock absorbency. Replace athletic shoes when they become worn out. Don t walk or run barefoot.  3. Shoe inserts are an important part of treatment. You can buy off-the-shelf shoe inserts inexpensively such as [employer].  The best ones are custom molded to your foot with a prescription.  4. Night splints keep the plantar fascia gently stretched while you sleep and will eliminate morning pain. Wear it ALL NIGHT EVERY NIGHT, or any time you sit for a long time.  5. Reduce by 10% or more the activities that stress the feet: jogging, prolonged  standing or walking, high impact sports, etc.  6. Stretch your feet. Gently flex your ankle by leaning into a wall or counter or drop your heel from a step.  Stretch two minutes of every hour you are awake.  7. Icing or massage may help heel pain. Apply an ice pack or frozen water or Coke bottle to the heel for 10-20 minutes as a preventive or after an acute flare of symptoms. You may repeat this as needed.   Follow up with your Doctor in 3 weeks as instructed.

## 2017-05-02 NOTE — NURSING NOTE
"Chief Complaint   Patient presents with     RECHECK     improvement w/NS, orthotics, voltaren, issues w/medial Left arch w/orthotic - B/L heel pain > Right, onset early 2016; LOV 3/28/2017     Diabetes     type 2       Initial Temp 98  F (36.7  C) (Temporal)  Ht 5' 0.43\" (1.535 m)  Wt 205 lb (93 kg)  BMI 39.46 kg/m2 Estimated body mass index is 39.46 kg/(m^2) as calculated from the following:    Height as of this encounter: 5' 0.43\" (1.535 m).    Weight as of this encounter: 205 lb (93 kg).  BP completed using cuff size: NA (Not Taken)  Medication Reconciliation: complete    Virginia Reyna CMA, May 2, 2017    "

## 2017-05-02 NOTE — MR AVS SNAPSHOT
"              After Visit Summary   5/2/2017    Mark Quick    MRN: [id number]           Patient Information     Date Of Birth          1980        Visit Information        Provider Department      5/2/2017 11:30 AM Karl Basilio DPM [facility]        Today's Diagnoses     Plantar fascial fibromatosis    -  1       Follow-ups after your visit        Additional Services     [facility] PT, HAND, AND CHIROPRACTIC REFERRAL                 Who to contact     If you have questions or need follow up information about today's clinic visit or your schedule please contact [facility] directly at [phone].  Normal or non-critical lab and imaging results will be communicated to you by [employer], letter or phone within 4 business days after the clinic has received the results. If you do not hear from us within 7 days, please contact the clinic through [employer]Tech[employer] or phone. If you have a critical or abnormal lab result, we will notify you by phone as soon as possible.  Submit refill requests through [employer] or call your pharmacy and they will forward the refill request to us. Please allow 3 business days for your refill to be completed.          Additional Information About Your Visit        MyChart Information     [employer] gives you secure access to your electronic health record. If you see a primary care provider, you can also send messages to your care team and make appointments. If you have questions, please call your primary care clinic.  If you do not have a primary care provider, please call [phone] and they will assist you.        Care EveryWhere ID     This is your Care EveryWhere ID. This could be used by other organizations to access your [facility] medical records  [id number]        Your Vitals Were     Temperature Height BMI (Body Mass Index)             98  F (36.7  C) (Temporal) 5' 0.43\" (1.535 m) 39.46 kg/m2          Blood Pressure from Last 3 Encounters:   12/21/16 116/72 "   10/14/16 116/76   09/06/16 119/81    Weight from Last 3 Encounters:   05/02/17 205 lb (93 kg)   03/28/17 205 lb (93 kg)   12/21/16 210 lb 9.6 oz (95.5 kg)              We Performed the Following     [facility] PT, HAND, AND CHIROPRACTIC REFERRAL        Primary Care Provider Office Phone # Fax #    Adriana BOCANEGRA OBEY Wade [phone]       [facility] [address] [facility] [address]        Thank you!     Thank you for choosing [facility]  for your care. Our goal is always to provide you with excellent care. Hearing back from our patients is one way we can continue to improve our services. Please take a few minutes to complete the written survey that you may receive in the mail after your visit with us. Thank you!             Your Updated Medication List - Protect others around you: Learn how to safely use, store and throw away your medicines at www.disposemymeds.org.          This list is accurate as of: 5/2/17 11:50 AM.  Always use your most recent med list.                   Brand Name Dispense Instructions for use    ASPIRIN NOT PRESCRIBED    INTENTIONAL     continuous prn for other Antiplatelet medication not prescribed intentionally due to Not indicated based on age       blood glucose monitoring meter device kit    no brand specified    1 kit    Use to test blood sugar four times daily or as directed.  Get meter that is covered by insurance       blood glucose monitoring test strip    no brand specified    12 Box    Use to test blood sugar four times daily or as directed. One touch ultra mini system       CALCIUM + D PO          citalopram 40 MG tablet    celeXA    90 tablet    Take 1 tablet (40 mg) by mouth daily       diclofenac 75 MG EC tablet    VOLTAREN    60 tablet    Take 1 tablet (75 mg) by mouth 2 times daily       FLINTSTONES COMPLETE PO          IBUPROFEN PO          lisinopril 5 MG tablet    PRINIVIL/ZESTRIL    90 tablet    TAKE ONE TABLET BY MOUTH ONE TIME  DAILY       nystatin-triamcinolone cream    MYCOLOG II    30 g    Apply to affected area BID up to 7 days prn rash       simvastatin 40 MG tablet    ZOCOR    90 tablet    Take 1 tablet (40 mg) by mouth At Bedtime       TRULICITY 1.5 MG/0.5ML pen   Generic drug:  dulaglutide     2 mL    INJECT 1.5 MG UNDER THE SKIN EVERY 7 DAYS

## 2017-05-02 NOTE — PROGRESS NOTES
"Chief Complaint   Patient presents with     RECHECK     improvement w/NS, orthotics, voltaren, issues w/medial Left arch w/orthotic - B/L heel pain > Right, onset early 2016; LOV 3/28/2017     Diabetes     type 2       Weight management plan: Patient was referred to their PCP to discuss a diet and exercise plan.     HPI:  Mark Quick is a 36 year old female who is seen in consultation at the request of self.    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    General appearance: Patient is alert and fully cooperative with history & exam.  No sign of distress is noted during the visit.     Psychiatric: Affect is pleasant & appropriate.  Patient appears motivated to improve health.     Respiratory: Breathing is regular & unlabored while sitting.     HEENT: Hearing is intact to spoken word.  Speech is clear.  No gross evidence of visual impairment that would impact ambulation.     Vascular: DP & PT 2/4 & regular bilaterally.  No significant edema, rubor or varicosities noted.  CFT and skin temperature is normal to both lower extremities.       Neurologic: Lower extremity sensation is intact to light touch.  No evidence of weakness in the lower extremities.  Protective threshold is intact +10/10 applications of a 5.0 7 monofilament.     Dermatologic: Skin is intact to both lower extremities without significant lesions, rash or abrasion.  Normal texture turgor and tone. No paronychia or evidence of soft tissue " infection is noted.    Musculoskeletal: Patient is ambulatory without assistive device or brace. Much reduced discomfort is noted with firm palpation along the medial band of the plantar fascia bilateral foot most notably at the origination upon the calcaneus not through the arch.  No pain with compression of the calcaneus medial to lateral or with palpation of the achilles, peroneal or posterior tibial tendons.  Slightly more than 0  of ankle joint dorsiflexion without crepitus or pain throughout the ankle, subtalar or midtarsal joints.  No pain or limitations throughout manual muscle strength testing plus 5/5 to all four quadrants bilateral.  No palpable edema noted.      Radiographs:  Osteophyte noted about the plantar calcaneus consistent with plantar fasciitis.    Hemoglobin A1C (%)   Date Value   12/21/2016 8.6 (H)   07/01/2016 7.1 (H)   04/05/2016 7.9 (H)   09/30/2015 6.2 (H)   06/12/2015 6.4 (H)   01/29/2015 6.0   11/04/2014 8.7 (H)   11/30/2011 5.3     Creatinine (mg/dL)   Date Value   07/01/2016 0.64   05/20/2016 0.69   04/05/2016 0.58   09/30/2015 0.65   06/12/2015 0.89   01/29/2015 0.74          ASSESSMENT:       ICD-10-CM    1. Plantar fascial fibromatosis M72.2 [facility] PT, HAND, AND CHIROPRACTIC REFERRAL   2. Uncontrolled type 2 diabetes mellitus without complication, without long-term current use of insulin (H) E11.65        PLAN:  Review[facility] in [facility] and discussed etiology and treatment options.      Treatments:  3/28/2017  Discontinue barefoot walking or unsupported walking in shoes without shank.  Dispensed written instructions to obtain appropriate shoe gear and/or OTC inserts.  Dispensed anterior night splint to use all night every night.  Prescription oral Voltaren for a short course. Discussed risks.  Prescription for custom molded orthotics 3/28/2017  Follow up in 4-5 weeks     Also discussed uncontrolled diabetes. We discussed how elevated blood glucose contributes to chronic  collagen cross-linking and increased risk of such things related to plantar fasciitis. I recommen recent reduced control of diabetes. She is also newly diagnosed in the last couple of years and will benefit from more aggressive education and understanding as well as medication adjustments. ded that she follow-up with her primary care provider for more aggressive management of blood glucose. Written instructions regarding lower extremity risk factors associated with diabetes also dispensed.    In addition to the above history and physical exam, approximately 45 minutes of time was spent with the patient, greater than 50% directly with the patient, counseling, educating, and coordinating care in regards to the above noted multiple diagnoses.     5/2/2017  Much improved discomfort now pain left greater than right medial band of plantar fascia  Offered injection but she refuses  PT with [facility] in ER.  Continue custom molded orthotics bilateral  Continue new shoes  Continue oral Voltaren  No barefoot walking  Follow-up in 4 weeks or at any point in time to repeat injection.        Karl Basilio, NIR

## 2017-05-04 ENCOUNTER — TELEPHONE (OUTPATIENT)
Dept: FAMILY MEDICINE | Facility: CLINIC | Age: 37
End: 2017-05-04

## 2017-05-04 DIAGNOSIS — E11.9 TYPE 2 DIABETES MELLITUS WITH HEMOGLOBIN A1C GOAL OF LESS THAN 7.0% (H): Primary | Chronic | ICD-10-CM

## 2017-05-04 NOTE — TELEPHONE ENCOUNTER
Summary:    Patient is due/failing the following:   Eye and foot exam, microalbumin, A1C and FOLLOW UP    Action needed:   Patient needs office visit for diabetic follow up. and Patient needs non-fasting lab only appointment    Type of outreach:    Phone, spoke to patient.  patient scheduled on 05/08/2017    Questions for provider review:    Patient is scheduled for a follow up on 05/08/2017 and will just complete her labs that morning.  Would you like the patient to complete her CMP and LDL then as well or wait until July                                                                                                                                     Breana Santamaria       Chart routed to PCP to clarify     Panel Management Review      Patient has the following on her problem list:     Diabetes    ASA:     Last A1C  Lab Results   Component Value Date    A1C 8.6 12/21/2016    A1C 7.1 07/01/2016    A1C 7.9 04/05/2016    A1C 6.2 09/30/2015    A1C 6.4 06/12/2015     A1C tested: FAILED    Last LDL:    Lab Results   Component Value Date    CHOL 152 07/01/2016     Lab Results   Component Value Date    HDL 43 07/01/2016     Lab Results   Component Value Date    LDL 71 07/01/2016     Lab Results   Component Value Date    TRIG 188 07/01/2016     Lab Results   Component Value Date    CHOLHDLRATIO 3.7 09/30/2015     Lab Results   Component Value Date    NHDL 109 07/01/2016       Is the patient on a Statin? NO             Is the patient on Aspirin? YES    Medications     HMG CoA Reductase Inhibitors    simvastatin (ZOCOR) 40 MG tablet          Last three blood pressure readings:  BP Readings from Last 3 Encounters:   12/21/16 116/72   10/14/16 116/76   09/06/16 119/81            Tobacco History:     History   Smoking Status     Former Smoker     Years: 1.00     Types: Cigarettes     Start date: 1/1/1989     Quit date: 1/1/1990   Smokeless Tobacco     Never Used           Composite cancer screening  Chart review shows that this  patient is due/due soon for the following None

## 2017-05-04 NOTE — PROGRESS NOTES
"  SUBJECTIVE:                                                    Mark Quick is a 36 year old female who presents to clinic today for the following health issues:  {Provider please address medication reconciliation discrepancies--rooming staff please delete if no med/rec issues}    Diabetes Follow-up      Patient is checking blood sugars: { :675859}    Diabetic concerns: {Diabetic Concerns:156847::\"None\"}     Symptoms of hypoglycemia (low blood sugar): { :530027::\"none\"}     Paresthesias (numbness or burning in feet) or sores: { :082675::\"No\"}     Date of last diabetic eye exam: ***     Hyperlipidemia Follow-Up      Rate your low fat/cholesterol diet?: { :389231::\"good\"}    Taking statin?  { :249818::\"No\"}    Other lipid medications/supplements?:  { :741208::\"none\"}       Amount of exercise or physical activity: {Exercise frequency days per week:530259}    Problems taking medications regularly: {Med Problems:129800::\"No\"}    Medication side effects: {CHRONIC MED SIDE EFFECTS:298374::\"none\"}    Diet: { :490251}      {additional problems for provider to add:664214}    Problem list and histories reviewed & adjusted, as indicated.  Additional history: {NONE - AS DOCUMENTED:437061::\"as documented\"}    {HIST REVIEW/ LINKS 2:524023}    Reviewed and updated as needed this visit by clinical staff       Reviewed and updated as needed this visit by Provider         {PROVIDER CHARTING PREFERENCE:804576}    "

## 2017-05-08 ENCOUNTER — OFFICE VISIT (OUTPATIENT)
Dept: FAMILY MEDICINE | Facility: CLINIC | Age: 37
End: 2017-05-08
Payer: COMMERCIAL

## 2017-05-08 VITALS
BODY MASS INDEX: 40.6 KG/M2 | RESPIRATION RATE: 16 BRPM | SYSTOLIC BLOOD PRESSURE: 118 MMHG | DIASTOLIC BLOOD PRESSURE: 78 MMHG | HEART RATE: 76 BPM | HEIGHT: 60 IN | WEIGHT: 206.8 LBS | TEMPERATURE: 98.2 F

## 2017-05-08 DIAGNOSIS — F41.1 GENERALIZED ANXIETY DISORDER: Chronic | ICD-10-CM

## 2017-05-08 DIAGNOSIS — E66.01 MORBID OBESITY WITH BMI OF 40.0-44.9, ADULT (H): Chronic | ICD-10-CM

## 2017-05-08 DIAGNOSIS — M53.3 SI (SACROILIAC) JOINT DYSFUNCTION: ICD-10-CM

## 2017-05-08 DIAGNOSIS — E11.9 TYPE 2 DIABETES MELLITUS WITH HEMOGLOBIN A1C GOAL OF LESS THAN 7.0% (H): Primary | Chronic | ICD-10-CM

## 2017-05-08 DIAGNOSIS — G62.89 OTHER POLYNEUROPATHY: ICD-10-CM

## 2017-05-08 LAB
ALBUMIN SERPL-MCNC: 3.5 G/DL (ref 3.4–5)
ALP SERPL-CCNC: 80 U/L (ref 40–150)
ALT SERPL W P-5'-P-CCNC: 23 U/L (ref 0–50)
ANION GAP SERPL CALCULATED.3IONS-SCNC: 6 MMOL/L (ref 3–14)
AST SERPL W P-5'-P-CCNC: 20 U/L (ref 0–45)
BILIRUB SERPL-MCNC: 0.6 MG/DL (ref 0.2–1.3)
BUN SERPL-MCNC: 12 MG/DL (ref 7–30)
CALCIUM SERPL-MCNC: 8.5 MG/DL (ref 8.5–10.1)
CHLORIDE SERPL-SCNC: 102 MMOL/L (ref 94–109)
CHOLEST SERPL-MCNC: 193 MG/DL
CO2 SERPL-SCNC: 30 MMOL/L (ref 20–32)
CREAT SERPL-MCNC: 0.66 MG/DL (ref 0.52–1.04)
CREAT UR-MCNC: 221 MG/DL
CRP SERPL-MCNC: 10.3 MG/L (ref 0–8)
ERYTHROCYTE [SEDIMENTATION RATE] IN BLOOD BY WESTERGREN METHOD: 16 MM/H (ref 0–20)
GFR SERPL CREATININE-BSD FRML MDRD: ABNORMAL ML/MIN/1.7M2
GLUCOSE SERPL-MCNC: 207 MG/DL (ref 70–99)
HBA1C MFR BLD: 6.5 % (ref 4.3–6)
HDLC SERPL-MCNC: 52 MG/DL
LDLC SERPL CALC-MCNC: 83 MG/DL
MICROALBUMIN UR-MCNC: 10 MG/L
MICROALBUMIN/CREAT UR: 4.62 MG/G CR (ref 0–25)
NONHDLC SERPL-MCNC: 141 MG/DL
POTASSIUM SERPL-SCNC: 4 MMOL/L (ref 3.4–5.3)
PROT SERPL-MCNC: 7.4 G/DL (ref 6.8–8.8)
SODIUM SERPL-SCNC: 138 MMOL/L (ref 133–144)
TRIGL SERPL-MCNC: 290 MG/DL

## 2017-05-08 PROCEDURE — 86618 LYME DISEASE ANTIBODY: CPT | Performed by: NURSE PRACTITIONER

## 2017-05-08 PROCEDURE — 82043 UR ALBUMIN QUANTITATIVE: CPT | Performed by: NURSE PRACTITIONER

## 2017-05-08 PROCEDURE — 86140 C-REACTIVE PROTEIN: CPT | Performed by: NURSE PRACTITIONER

## 2017-05-08 PROCEDURE — 99214 OFFICE O/P EST MOD 30 MIN: CPT | Performed by: NURSE PRACTITIONER

## 2017-05-08 PROCEDURE — 80061 LIPID PANEL: CPT | Performed by: NURSE PRACTITIONER

## 2017-05-08 PROCEDURE — 80053 COMPREHEN METABOLIC PANEL: CPT | Performed by: NURSE PRACTITIONER

## 2017-05-08 PROCEDURE — 99207 C FOOT EXAM  NO CHARGE: CPT | Performed by: NURSE PRACTITIONER

## 2017-05-08 PROCEDURE — 85652 RBC SED RATE AUTOMATED: CPT | Performed by: NURSE PRACTITIONER

## 2017-05-08 PROCEDURE — 83036 HEMOGLOBIN GLYCOSYLATED A1C: CPT | Performed by: NURSE PRACTITIONER

## 2017-05-08 PROCEDURE — 36415 COLL VENOUS BLD VENIPUNCTURE: CPT | Performed by: NURSE PRACTITIONER

## 2017-05-08 PROCEDURE — 86431 RHEUMATOID FACTOR QUANT: CPT | Performed by: NURSE PRACTITIONER

## 2017-05-08 PROCEDURE — 86038 ANTINUCLEAR ANTIBODIES: CPT | Performed by: NURSE PRACTITIONER

## 2017-05-08 ASSESSMENT — PAIN SCALES - GENERAL: PAINLEVEL: MILD PAIN (2)

## 2017-05-08 ASSESSMENT — ANXIETY QUESTIONNAIRES
2. NOT BEING ABLE TO STOP OR CONTROL WORRYING: SEVERAL DAYS
IF YOU CHECKED OFF ANY PROBLEMS ON THIS QUESTIONNAIRE, HOW DIFFICULT HAVE THESE PROBLEMS MADE IT FOR YOU TO DO YOUR WORK, TAKE CARE OF THINGS AT HOME, OR GET ALONG WITH OTHER PEOPLE: SOMEWHAT DIFFICULT
1. FEELING NERVOUS, ANXIOUS, OR ON EDGE: SEVERAL DAYS
7. FEELING AFRAID AS IF SOMETHING AWFUL MIGHT HAPPEN: NOT AT ALL
6. BECOMING EASILY ANNOYED OR IRRITABLE: MORE THAN HALF THE DAYS
GAD7 TOTAL SCORE: 7
3. WORRYING TOO MUCH ABOUT DIFFERENT THINGS: SEVERAL DAYS
5. BEING SO RESTLESS THAT IT IS HARD TO SIT STILL: SEVERAL DAYS

## 2017-05-08 ASSESSMENT — PATIENT HEALTH QUESTIONNAIRE - PHQ9: 5. POOR APPETITE OR OVEREATING: SEVERAL DAYS

## 2017-05-08 NOTE — PROGRESS NOTES
SUBJECTIVE:                                                    Mark Quick is a 36 year old female who presents to clinic today for the following health issues:      Diabetes Follow-up      Patient is checking blood sugars: rarely.      Diabetic concerns: None     Symptoms of hypoglycemia (low blood sugar): none     Paresthesias (numbness or burning in feet) or sores: No     Date of last diabetic eye exam: due     Hyperlipidemia Follow-Up      Rate your low fat/cholesterol diet?: fair    Taking statin?  Yes, no muscle aches from statin    Other lipid medications/supplements?:  none       Amount of exercise or physical activity: none recently    Problems taking medications regularly: No    Medication side effects: none    Diet: regular (no restrictions)    Back Pain      Duration: x 1-2 weeks        Specific cause: none    Description:   Location of pain: low back both  Character of pain: sharp, ache and pinching  Pain radiation:radiates into the left buttocks  New numbness or weakness in legs, not attributed to pain:  no - soreness    Intensity: Currently 2/10    History:   Pain interferes with job: No  History of back problems: recurrent self limited episodes of low back pain in the past  Any previous MRI or X-rays: None  Sees a specialist for back pain:  No  Therapies tried without relief: ibuprofen, hot bath    Alleviating factors:   Improved by: ibuprofen for a short time      Precipitating factors:  Worsened by: Nothing    Functional and Psychosocial Screen (Rene STarT Back):      Not performed today       Accompanying Signs & Symptoms:  Risk of Fracture:  None  Risk of Cauda Equina:  None  Risk of Infection:  None  Risk of Cancer:  None  Risk of Ankylosing Spondylitis:  Onset at age <35, male, AND morning back stiffness. no                    Patient experiences foot pain that she has seen Dr Basilio for. She relates the pain limits her walking and makes it hard to stand up. She wore air cast splints for  plantar fasciitis, and had her feet molded for orthotics. She notes it has been hard to get used to. She reports associated fatigue in legs, and notes her thighs are sore.     Patient reports she has had middle lower back pain for 1-2 weeks. Yesterday, she notes that it felt like a pinching pain that caused her to be nauseous. She relates the pain was improved with ibuprofen and hot bath. She notes today her back is sore, but no pinching sensation. She endorses knee pain, but denies hip pain. She reports she is also fatigued. Patient states she and family went a road trip.     Patient states she has been stresses this past month as she has been looking for a new job and finals. She has one company that is interested in her. She is working at [employer] full time until she finds another job.     Patient has lost 11 pounds with Weight Watchers. She relates she has been eating less sugar.     She does not have a CPAP.     Problem list and histories reviewed & adjusted, as indicated.  Additional history: as documented    Patient Active Problem List   Diagnosis     Type 2 diabetes mellitus with hemoglobin A1c goal of less than 7.0% (H)     Hyperlipidemia LDL goal <130     Generalized anxiety disorder     Stress incontinence in female     Candidal intertrigo     Morbid obesity with BMI of 40.0-44.9, adult (H)     Fatty liver     Restless legs syndrome (RLS)     Low ferritin     LIANA (obstructive sleep apnea)- mild (AHI 14)     Past Surgical History:   Procedure Laterality Date      SECTION  , , ,      TONSILLECTOMY         Social History   Substance Use Topics     Smoking status: Former Smoker     Years: 1.00     Types: Cigarettes     Start date: 1989     Quit date: 1990     Smokeless tobacco: Never Used     Alcohol use Yes      Comment: Very infrequent     Family History   Problem Relation Age of Onset     DIABETES Mother      DIABETES Maternal Grandmother      HEART DISEASE Maternal  Grandfather      Asthma No family hx of      C.A.D. No family hx of      Hypertension No family hx of      CEREBROVASCULAR DISEASE No family hx of      Breast Cancer No family hx of      Cancer - colorectal No family hx of      Prostate Cancer No family hx of      Alcohol/Drug No family hx of      Allergies No family hx of      Alzheimer Disease No family hx of      Anesthesia Reaction No family hx of      Arthritis No family hx of      Blood Disease No family hx of      CANCER No family hx of      Cardiovascular No family hx of      Circulatory No family hx of      Congenital Anomalies No family hx of      Connective Tissue Disorder No family hx of      Depression No family hx of      Endocrine Disease No family hx of      Eye Disorder No family hx of      Coronary Artery Disease No family hx of      Hyperlipidemia No family hx of      Ovarian Cancer No family hx of      Depression/Anxiety No family hx of      Thyroid Disease No family hx of      OSTEOPOROSIS No family hx of      Chemical Addiction No family hx of      Known Genetic Syndrome No family hx of      MENTAL ILLNESS No family hx of      Anxiety Disorder No family hx of      Colon Cancer No family hx of      Other Cancer No family hx of      Substance Abuse No family hx of          Current Outpatient Prescriptions   Medication Sig Dispense Refill     dulaglutide (TRULICITY) 1.5 MG/0.5ML pen Inject 1.5 mg Subcutaneous every 7 days 6 mL 1     lisinopril (PRINIVIL/ZESTRIL) 5 MG tablet TAKE ONE TABLET BY MOUTH ONE TIME DAILY 90 tablet 0     Pediatric Multivit-Minerals-C (FLINTSTONES COMPLETE PO)        nystatin-triamcinolone (MYCOLOG II) cream Apply to affected area BID up to 7 days prn rash 30 g 3     citalopram (CELEXA) 40 MG tablet Take 1 tablet (40 mg) by mouth daily 90 tablet 3     simvastatin (ZOCOR) 40 MG tablet Take 1 tablet (40 mg) by mouth At Bedtime 90 tablet 3     IBUPROFEN PO        Calcium Citrate-Vitamin D (CALCIUM + D PO) Reported on 5/8/2017   "     blood glucose monitoring (NO BRAND SPECIFIED) test strip Use to test blood sugar four times daily or as directed.  One touch ultra mini system 12 Box 4     ASPIRIN NOT PRESCRIBED (INTENTIONAL) continuous prn for other Antiplatelet medication not prescribed intentionally due to Not indicated based on age       blood glucose meter (NO BRAND SPECIFIED) meter device kit Use to test blood sugar four times daily or as directed.    Get meter that is covered by insurance 1 kit 0       Reviewed and updated as needed this visit by clinical staff  Tobacco  Allergies  Meds  Problems  Med Hx  Surg Hx  Fam Hx  Soc Hx        Reviewed and updated as needed this visit by Provider  Allergies  Meds  Problems         ROS:  Constitutional, neuro, ENT, endocrine, pulmonary, cardiac, gastrointestinal, genitourinary, musculoskeletal, integument and psychiatric systems are negative, except as otherwise noted.    This document serves as a record of the services and decisions personally performed and made by Adriana Wade DNP. It was created on her behalf by Sana Vasquez, a trained medical scribe. The creation of this document is based on the provider's statements to the medical scribe.  Sana Vasquez 9:58 AM May 8, 2017    OBJECTIVE:                                                    /78 (BP Location: Left arm, Patient Position: Chair, Cuff Size: Adult Large)  Pulse 76  Temp 98.2  F (36.8  C) (Temporal)  Resp 16  Ht 4' 11.84\" (1.52 m)  Wt 206 lb 12.8 oz (93.8 kg)  LMP 04/23/2017  BMI 40.6 kg/m2  Body mass index is 40.6 kg/(m^2).  GENERAL APPEARANCE: healthy, alert and no distress  HENT: ear canals and TM's normal and nose and mouth without ulcers or lesions  NECK: no adenopathy, no asymmetry, masses, or scars and thyroid normal to palpation  RESP: lungs clear to auscultation - no rales, rhonchi or wheezes  CV: regular rates and rhythm, normal S1 S2, no S3 or S4 and no murmur, click or rub  MS: Tender in " paraspinal lumbar and bilateral SI joints, normal ROM, push pull is normal, patellar reflexes are normal. extremities normal- no gross deformities noted, no joint effusions noted.   NEURO: Normal strength and tone, mentation intact and speech normal  PSYCH: mentation appears normal and affect normal/bright  Foot: no ulcers, tender at fascia insertion bilat. Heels, left > right.     Diagnostic test results:  Results for orders placed or performed in visit on 05/08/17 (from the past 24 hour(s))   ESR: Erythrocyte sedimentation rate   Result Value Ref Range    Sed Rate 16 0 - 20 mm/h   Hemoglobin A1c   Result Value Ref Range    Hemoglobin A1C 6.5 (H) 4.3 - 6.0 %        ASSESSMENT/PLAN:                                                        ICD-10-CM    1. Type 2 diabetes mellitus with hemoglobin A1c goal of less than 7.0% (H) E11.9 Albumin Random Urine Quantitative     FOOT EXAM     Hemoglobin A1c     **A1C FUTURE 3mo     Comprehensive metabolic panel (BMP + Alb, Alk Phos, ALT, AST, Total. Bili, TP)     Lipid panel reflex to direct LDL     dulaglutide (TRULICITY) 1.5 MG/0.5ML pen   2. Other polyneuropathy (H) G62.89 ESR: Erythrocyte sedimentation rate     CRP, inflammation     Rheumatoid factor     Antinuclear antibody screen by EIA     Lyme Disease Ldia with reflex to WB Serum   3. Generalized anxiety disorder F41.1    4. Morbid obesity with BMI of 40.0-44.9, adult (H) E66.01     Z68.41    5. SI (sacroiliac) joint dysfunction M53.3        Discussed low back pain. Suspect the back pain is due to long travel in car. Discussed home cares including icing and ibuprofen for pain relief. Screening for auto-immune/infalmmatory vs. Infections with multiple MS regions of complaints.     Order placed for labs that the patient will complete today. Patient had one glass of juice for breakfast.     Continue weight Watchers, lifestyle management.   Exercise as tolerated.   Refilling meds.     Follow up with Provider - 3 months       The information in this document, created by the medical scribe for me, accurately reflects the services I personally performed and the decisions made by me. I have reviewed and approved this document for accuracy prior to leaving the patient care area.  May 8, 2017 10:11 AM    OBEY [facility]

## 2017-05-08 NOTE — MR AVS SNAPSHOT
After Visit Summary   5/8/2017    Mark Quick    MRN: [id number]           Patient Information     Date Of Birth          1980        Visit Information        Provider Department      5/8/2017 9:40 AM Adriana Wade APRN CNP [facility]        Today's Diagnoses     Type 2 diabetes mellitus with hemoglobin A1c goal of less than 7.0% (H)    -  1    Other polyneuropathy (H)        Generalized anxiety disorder        Morbid obesity with BMI of 40.0-44.9, adult (H)        SI (sacroiliac) joint dysfunction           Follow-ups after your visit        Your next 10 appointments already scheduled     Jun 06, 2017  9:00 AM CDT   [facility]    [address] [facility] [address] [phone]              Future tests that were ordered for you today     Open Future Orders        Priority Expected Expires Ordered    **A1C FUTURE 3mo Routine 8/6/2017 9/5/2017 5/8/2017            Who to contact     If you have questions or need follow up information about today's clinic visit or your schedule please contact [facility] directly at [phone].  Normal or non-critical lab and imaging results will be communicated to you by MyChart, letter or phone within 4 business days after the clinic has received the results. If you do not hear from us within 7 days, please contact the clinic through MyChart or phone. If you have a critical or abnormal lab result, we will notify you by phone as soon as possible.  Submit refill requests through ConnectTo[facility] or call your pharmacy and they will forward the refill request to us. Please allow 3 business days for your refill to be completed.          Additional Information About Your Visit        [email or web address].gghart Information     ConnectToHome gives you secure access to your electronic health record. If you see a primary care provider, you can also send messages to your care team and  "make appointments. If you have questions, please call your primary care clinic.  If you do not have a primary care provider, please call [phone] and they will assist you.        Care EveryWhere ID     This is your Care EveryWhere ID. This could be used by other organizations to access your [facility] medical records  [id number]        Your Vitals Were     Pulse Temperature Respirations Height Last Period BMI (Body Mass Index)    76 98.2  F (36.8  C) (Temporal) 16 4' 11.84\" (1.52 m) 04/23/2017 40.6 kg/m2       Blood Pressure from Last 3 Encounters:   05/08/17 118/78   12/21/16 116/72   10/14/16 116/76    Weight from Last 3 Encounters:   05/08/17 206 lb 12.8 oz (93.8 kg)   05/02/17 205 lb (93 kg)   03/28/17 205 lb (93 kg)              We Performed the Following     Albumin Random Urine Quantitative     Antinuclear antibody screen by EIA     Comprehensive metabolic panel (BMP + Alb, Alk Phos, ALT, AST, Total. Bili, TP)     CRP, inflammation     ESR: Erythrocyte sedimentation rate     FOOT EXAM     Hemoglobin A1c     Lipid panel reflex to direct LDL     Lyme Disease Lida with reflex to WB Serum     Rheumatoid factor          Today's Medication Changes          These changes are accurate as of: 5/8/17  3:19 PM.  If you have any questions, ask your nurse or doctor.               These medicines have changed or have updated prescriptions.        Dose/Directions    dulaglutide 1.5 MG/0.5ML pen   Commonly known as:  TRULICITY   This may have changed:  See the new instructions.   Used for:  Type 2 diabetes mellitus with hemoglobin A1c goal of less than 7.0% (H)   Changed by:  Adriana Wade, OBEY CNP        Dose:  1.5 mg   Inject 1.5 mg Subcutaneous every 7 days   Quantity:  6 mL   Refills:  1            Where to get your medicines      These medications were sent to [facility] [id number] - [place] - [address]  [address], [place] [address]     Phone:  [phone]     dulaglutide 1.5 MG/0.5ML pen "                Primary Care Provider Office Phone # Fax #    OBEY Cabello [phone]       [facility] [address] [place] [address]        Thank you!     Thank you for choosing [facility]  for your care. Our goal is always to provide you with excellent care. Hearing back from our patients is one way we can continue to improve our services. Please take a few minutes to complete the written survey that you may receive in the mail after your visit with us. Thank you!             Your Updated Medication List - Protect others around you: Learn how to safely use, store and throw away your medicines at www.disposemymeds.org.          This list is accurate as of: 5/8/17  3:19 PM.  Always use your most recent med list.                   Brand Name Dispense Instructions for use    ASPIRIN NOT PRESCRIBED    INTENTIONAL     continuous prn for other Antiplatelet medication not prescribed intentionally due to Not indicated based on age       blood glucose monitoring meter device kit    no brand specified    1 kit    Use to test blood sugar four times daily or as directed.  Get meter that is covered by insurance       blood glucose monitoring test strip    no brand specified    12 Box    Use to test blood sugar four times daily or as directed. One touch ultra mini system       CALCIUM + D PO      Reported on 5/8/2017       citalopram 40 MG tablet    celeXA    90 tablet    Take 1 tablet (40 mg) by mouth daily       dulaglutide 1.5 MG/0.5ML pen    TRULICITY    6 mL    Inject 1.5 mg Subcutaneous every 7 days       FLINTSTONES COMPLETE PO          IBUPROFEN PO          lisinopril 5 MG tablet    PRINIVIL/ZESTRIL    90 tablet    TAKE ONE TABLET BY MOUTH ONE TIME DAILY       nystatin-triamcinolone cream    MYCOLOG II    30 g    Apply to affected area BID up to 7 days prn rash       simvastatin 40 MG tablet    ZOCOR    90 tablet    Take 1 tablet (40 mg) by mouth At Bedtime

## 2017-05-08 NOTE — NURSING NOTE
"Chief Complaint   Patient presents with     Diabetes     Panel Management     CARLOS EDUARDO, Microalbumin, Eye Exam, Foot Exam       Initial /78 (BP Location: Left arm, Patient Position: Chair, Cuff Size: Adult Large)  Pulse 76  Temp 98.2  F (36.8  C) (Temporal)  Resp 16  Ht 4' 11.84\" (1.52 m)  Wt 206 lb 12.8 oz (93.8 kg)  LMP 04/23/2017  BMI 40.6 kg/m2 Estimated body mass index is 40.6 kg/(m^2) as calculated from the following:    Height as of this encounter: 4' 11.84\" (1.52 m).    Weight as of this encounter: 206 lb 12.8 oz (93.8 kg).  Medication Reconciliation: complete  Maria T Magana CMA (AAMA)    "

## 2017-05-09 ENCOUNTER — MYC MEDICAL ADVICE (OUTPATIENT)
Dept: FAMILY MEDICINE | Facility: CLINIC | Age: 37
End: 2017-05-09

## 2017-05-09 LAB
ANA SER QL IA: NORMAL
RHEUMATOID FACT SER NEPH-ACNC: <20 IU/ML (ref 0–20)

## 2017-05-09 ASSESSMENT — PATIENT HEALTH QUESTIONNAIRE - PHQ9: SUM OF ALL RESPONSES TO PHQ QUESTIONS 1-9: 5

## 2017-05-09 ASSESSMENT — ANXIETY QUESTIONNAIRES: GAD7 TOTAL SCORE: 7

## 2017-05-11 LAB — B BURGDOR IGG+IGM SER QL: 0.15 (ref 0–0.89)

## 2017-05-26 ENCOUNTER — MYC MEDICAL ADVICE (OUTPATIENT)
Dept: FAMILY MEDICINE | Facility: CLINIC | Age: 37
End: 2017-05-26

## 2017-05-26 DIAGNOSIS — N92.0 HEAVY PERIODS: Primary | ICD-10-CM

## 2017-05-26 NOTE — TELEPHONE ENCOUNTER
Please advise the patient that Adriana is out today and will address this next week when she is back in clinic. Jimmy as PCP only after the call.    Julián Mcmahon MD

## 2017-05-26 NOTE — TELEPHONE ENCOUNTER
Lm for the patient to return call to the clinic to discuss the below. Will await to hear from patient. Christina Beckford RN, BSN   Responded via PlasmaSi. Christina Beckford RN, BSN

## 2017-05-26 NOTE — TELEPHONE ENCOUNTER
Informed Provider of the message form the patient.  She says:  Have RN place referral for OBGYN Dr Padilla or Dr. Aly and respond to patient.

## 2017-06-06 ENCOUNTER — OFFICE VISIT (OUTPATIENT)
Dept: PODIATRY | Facility: OTHER | Age: 37
End: 2017-06-06
Payer: COMMERCIAL

## 2017-06-06 VITALS — HEART RATE: 88 BPM | WEIGHT: 206 LBS | HEIGHT: 60 IN | BODY MASS INDEX: 40.44 KG/M2

## 2017-06-06 DIAGNOSIS — E11.9 TYPE 2 DIABETES MELLITUS WITH HEMOGLOBIN A1C GOAL OF LESS THAN 7.0% (H): ICD-10-CM

## 2017-06-06 DIAGNOSIS — M72.2 PLANTAR FASCIAL FIBROMATOSIS: Primary | ICD-10-CM

## 2017-06-06 PROCEDURE — 99213 OFFICE O/P EST LOW 20 MIN: CPT | Performed by: PODIATRIST

## 2017-06-06 ASSESSMENT — PAIN SCALES - GENERAL: PAINLEVEL: NO PAIN (1)

## 2017-06-06 NOTE — PROGRESS NOTES
"Chief Complaint   Patient presents with     RECHECK     improvement w/NS, orthotics, pain 1, pinching in Right heel - B/L heel pain > Right, onset early 2016; LOV 5/2/2017     Diabetes     type 2       Weight management plan: Patient was referred to their PCP to discuss a diet and exercise plan.      HPI:  Mark Quick is a 36 year old female who is seen in consultation at the request of self.    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    General appearance: Patient is alert and fully cooperative with history & exam.  No sign of distress is noted during the visit.     Psychiatric: Affect is pleasant & appropriate.  Patient appears motivated to improve health.     Respiratory: Breathing is regular & unlabored while sitting.     HEENT: Hearing is intact to spoken word.  Speech is clear.  No gross evidence of visual impairment that would impact ambulation.     Vascular: DP & PT 2/4 & regular bilaterally.  No significant edema, rubor or varicosities noted.  CFT and skin temperature is normal to both lower extremities.       Neurologic: Lower extremity sensation is intact to light touch.  No evidence of weakness in the lower extremities.  Protective threshold is intact +10/10 applications of a 5.0 7 monofilament.     Dermatologic: Skin is intact to both lower extremities without significant lesions, rash or abrasion.  Normal texture turgor and tone. No paronychia or evidence of soft tissue infection is " noted.    Musculoskeletal: Patient is ambulatory without assistive device or brace. Much reduced discomfort is noted with firm palpation along the medial band of the plantar fascia bilateral foot most notably at the origination upon the calcaneus not through the arch.  No pain with compression of the calcaneus medial to lateral or with palpation of the achilles, peroneal or posterior tibial tendons.  Slightly more than 0  of ankle joint dorsiflexion without crepitus or pain throughout the ankle, subtalar or midtarsal joints.  No pain or limitations throughout manual muscle strength testing plus 5/5 to all four quadrants bilateral.  No palpable edema noted.      Radiographs:  Osteophyte noted about the plantar calcaneus consistent with plantar fasciitis.    Hemoglobin A1C (%)   Date Value   05/08/2017 6.5 (H)   12/21/2016 8.6 (H)   07/01/2016 7.1 (H)   04/05/2016 7.9 (H)   09/30/2015 6.2 (H)   06/12/2015 6.4 (H)   01/29/2015 6.0   11/04/2014 8.7 (H)     Creatinine (mg/dL)   Date Value   05/08/2017 0.66   07/01/2016 0.64   05/20/2016 0.69   04/05/2016 0.58   09/30/2015 0.65   06/12/2015 0.89          ASSESSMENT:       ICD-10-CM    1. Plantar fascial fibromatosis M72.2    2. Type 2 diabetes mellitus with hemoglobin A1c goal of less than 7.0% (H) E11.9        PLAN:  Reviewed patient[facility] in [facility] and discussed etiology and treatment options.      Treatments:  3/28/2017  Discontinue barefoot walking or unsupported walking in shoes without shank.  Dispensed written instructions to obtain appropriate shoe gear and/or OTC inserts.  Dispensed anterior night splint to use all night every night.  Prescription oral Voltaren for a short course. Discussed risks.  Prescription for custom molded orthotics 3/28/2017  Follow up in 4-5 weeks     Also discussed uncontrolled diabetes. We discussed how elevated blood glucose contributes to chronic collagen cross-linking and increased risk of such things related to plantar fasciitis. I  recommen recent reduced control of diabetes. She is also newly diagnosed in the last couple of years and will benefit from more aggressive education and understanding as well as medication adjustments. ded that she follow-up with her primary care provider for more aggressive management of blood glucose. Written instructions regarding lower extremity risk factors associated with diabetes also dispensed.    In addition to the above history and physical exam, approximately 45 minutes of time was spent with the patient, greater than 50% directly with the patient, counseling, educating, and coordinating care in regards to the above noted multiple diagnoses.     5/2/2017  Much improved discomfort now pain left greater than right medial band of plantar fascia  Offered injection but she refuses  PT with ERIN in ER.  Continue custom molded orthotics bilateral  Continue new shoes  Continue oral Voltaren  No barefoot walking  Follow-up in 4 weeks or at any point in time to repeat injection.     6/6/2017  Had orthotic 2 months  Still using night splint  stoppd voltren and did not do rxed PT.  She is 80-90% improved.  Offered injection and she said no. RTC 1-2 months with any continued discomfort otherwise as needed.      Karl Basilio, STANLEYM

## 2017-06-06 NOTE — PATIENT INSTRUCTIONS
PLANTAR FASCIITIS  The  plantar fascia  is a thick fibrous layer of tissue that covers the bones on the bottom of your foot. It supports the foot bones in an arched position.  Plantar fasciitis  is a painful inflammation of the plantar fascia due to overuse. This can develop gradually or suddenly. It usually affects one foot at a time but can affect both feet. Heel pain can be sharp and feel like a knife sticking in the bottom of your foot. Pain may occur after exercising, long distance jogging, stair climbing, long periods of standing, or after getting up from a seated position.  Risk factors include arthritis, diabetes, obesity or recent weight gain, flat-foot, high arch, wearing high heels or loose shoes or shoes with poor arch support.  Sudden changes in activity or shoe gear may contribute to symptoms.  Foot pain from this condition is usually worse in the morning and improves with walking. By the end of the day there may be a dull aching. Treatment requires improved support of feet, short-term rest and controlling inflammation. It may take up to nine months before all symptoms go away with the measures described below.  A steroid injection into the foot, or surgery may be needed if this is becomes long standing or severe.  HOME CARE  1. If you are overweight, lose weight to promote healing.  2. Choose supportive shoes (stiff through the shank) with good arch support and shock absorbency. Replace athletic shoes when they become worn out. Don t walk or run barefoot.  3. Shoe inserts are an important part of treatment. You can buy off-the-shelf shoe inserts inexpensively such as [employer].  The best ones are custom molded to your foot with a prescription.  4. Night splints keep the plantar fascia gently stretched while you sleep and will eliminate morning pain. Wear it ALL NIGHT EVERY NIGHT, or any time you sit for a long time.  5. Reduce by 10% or more the activities that stress the feet: jogging, prolonged  standing or walking, high impact sports, etc.  6. Stretch your feet. Gently flex your ankle by leaning into a wall or counter or drop your heel from a step.  Stretch two minutes of every hour you are awake.  7. Icing or massage may help heel pain. Apply an ice pack or frozen water or Coke bottle to the heel for 10-20 minutes as a preventive or after an acute flare of symptoms. You may repeat this as needed.   Follow up with your Doctor in 3 weeks as instructed.

## 2017-06-06 NOTE — NURSING NOTE
"Chief Complaint   Patient presents with     RECHECK     improvement w/NS, orthotics, pain 1, pinching in Right heel - B/L heel pain > Right, onset early 2016; LOV 5/2/2017     Diabetes     type 2       Initial Pulse 88  Ht 4' 11.84\" (1.52 m)  Wt 206 lb (93.4 kg)  LMP 04/23/2017  BMI 40.45 kg/m2 Estimated body mass index is 40.45 kg/(m^2) as calculated from the following:    Height as of this encounter: 4' 11.84\" (1.52 m).    Weight as of this encounter: 206 lb (93.4 kg).  BP completed using cuff size: NA (Not Taken)  Medication Reconciliation: complete    Virginia Reyna CMA, June 6, 2017  "

## 2017-06-06 NOTE — MR AVS SNAPSHOT
After Visit Summary   6/6/2017    Mark Quick    MRN: [id number]           Patient Information     Date Of Birth          1980        Visit Information        Provider Department      6/6/2017 9:00 AM Karl Basilio DPM [facility]        Today's Diagnoses     Plantar fascial fibromatosis    -  1    Type 2 diabetes mellitus with hemoglobin A1c goal of less than 7.0% (H)          Care Instructions    PLANTAR FASCIITIS  The  plantar fascia  is a thick fibrous layer of tissue that covers the bones on the bottom of your foot. It supports the foot bones in an arched position.  Plantar fasciitis  is a painful inflammation of the plantar fascia due to overuse. This can develop gradually or suddenly. It usually affects one foot at a time but can affect both feet. Heel pain can be sharp and feel like a knife sticking in the bottom of your foot. Pain may occur after exercising, long distance jogging, stair climbing, long periods of standing, or after getting up from a seated position.  Risk factors include arthritis, diabetes, obesity or recent weight gain, flat-foot, high arch, wearing high heels or loose shoes or shoes with poor arch support.  Sudden changes in activity or shoe gear may contribute to symptoms.  Foot pain from this condition is usually worse in the morning and improves with walking. By the end of the day there may be a dull aching. Treatment requires improved support of feet, short-term rest and controlling inflammation. It may take up to nine months before all symptoms go away with the measures described below.  A steroid injection into the foot, or surgery may be needed if this is becomes long standing or severe.  HOME CARE  1. If you are overweight, lose weight to promote healing.  2. Choose supportive shoes (stiff through the shank) with good arch support and shock absorbency. Replace athletic shoes when they become worn out. Don t walk or run barefoot.  3. Shoe  inserts are an important part of treatment. You can buy off-the-shelf shoe inserts inexpensively such as [employer].  The best ones are custom molded to your foot with a prescription.  4. Night splints keep the plantar fascia gently stretched while you sleep and will eliminate morning pain. Wear it ALL NIGHT EVERY NIGHT, or any time you sit for a long time.  5. Reduce by 10% or more the activities that stress the feet: jogging, prolonged standing or walking, high impact sports, etc.  6. Stretch your feet. Gently flex your ankle by leaning into a wall or counter or drop your heel from a step.  Stretch two minutes of every hour you are awake.  7. Icing or massage may help heel pain. Apply an ice pack or frozen water or Coke bottle to the heel for 10-20 minutes as a preventive or after an acute flare of symptoms. You may repeat this as needed.   Follow up with your Doctor in 3 weeks as instructed.            Follow-ups after your visit        Who to contact     If you have questions or need follow up information about today's clinic visit or your schedule please contact [facility] directly at [phone].  Normal or non-critical lab and imaging results will be communicated to you by [employer], letter or phone within 4 business days after the clinic has received the results. If you do not hear from us within 7 days, please contact the clinic through [employer] or phone. If you have a critical or abnormal lab result, we will notify you by phone as soon as possible.  Submit refill requests through [employer] or call your pharmacy and they will forward the refill request to us. Please allow 3 business days for your refill to be completed.          Additional Information About Your Visit        [employer] Information     [employer] gives you secure access to your electronic health record. If you see a primary care provider, you can also send messages to your care team and make appointments. If you have questions, please  "call your primary care clinic.  If you do not have a primary care provider, please call [phone] and they will assist you.        Care EveryWhere ID     This is your Care EveryWhere ID. This could be used by other organizations to access your [facility] medical records  [id number]        Your Vitals Were     Pulse Height Last Period BMI (Body Mass Index)          88 4' 11.84\" (1.52 m) 04/23/2017 40.45 kg/m2         Blood Pressure from Last 3 Encounters:   05/08/17 118/78   12/21/16 116/72   10/14/16 116/76    Weight from Last 3 Encounters:   06/06/17 206 lb (93.4 kg)   05/08/17 206 lb 12.8 oz (93.8 kg)   05/02/17 205 lb (93 kg)              Today, you had the following     No orders found for display       Primary Care Provider Office Phone # Fax #    OBEY Cabello [facility] [phone]       [facility] [place] [address] [facility] [place] [address]        Thank you!     Thank you for choosing [facility]  for your care. Our goal is always to provide you with excellent care. Hearing back from our patients is one way we can continue to improve our services. Please take a few minutes to complete the written survey that you may receive in the mail after your visit with us. Thank you!             Your Updated Medication List - Protect others around you: Learn how to safely use, store and throw away your medicines at www.disposemymeds.org.          This list is accurate as of: 6/6/17 10:09 AM.  Always use your most recent med list.                   Brand Name Dispense Instructions for use    ASPIRIN NOT PRESCRIBED    INTENTIONAL     continuous prn for other Antiplatelet medication not prescribed intentionally due to Not indicated based on age       blood glucose monitoring meter device kit    no brand specified    1 kit    Use to test blood sugar four times daily or as directed.  Get meter that is covered by insurance       blood glucose monitoring test strip    no brand specified    " 12 Box    Use to test blood sugar four times daily or as directed. One touch ultra mini system       CALCIUM + D PO      Reported on 5/8/2017       citalopram 40 MG tablet    celeXA    90 tablet    Take 1 tablet (40 mg) by mouth daily       dulaglutide 1.5 MG/0.5ML pen    TRULICITY    6 mL    Inject 1.5 mg Subcutaneous every 7 days       FLINTSTONES COMPLETE PO          IBUPROFEN PO          lisinopril 5 MG tablet    PRINIVIL/ZESTRIL    90 tablet    TAKE ONE TABLET BY MOUTH ONE TIME DAILY       nystatin-triamcinolone cream    MYCOLOG II    30 g    Apply to affected area BID up to 7 days prn rash       simvastatin 40 MG tablet    ZOCOR    90 tablet    Take 1 tablet (40 mg) by mouth At Bedtime

## 2017-08-10 NOTE — PROGRESS NOTES
SUBJECTIVE:                                                    Mark Quick is a 36 year old female who presents to clinic today for the following health issues:    Diabetes Follow-up    Patient is checking blood sugars: twice daily.    Blood sugar testing frequency justification: Uncontrolled diabetes  Results are as follows:       am - 140-150       bedtime - 130-160        Diabetic concerns: None     Symptoms of hypoglycemia (low blood sugar): none     Paresthesias (numbness or burning in feet) or sores: No   Date of last diabetic eye exam: Needs to schedule      Amount of exercise or physical activity: 0- pt is working    Problems taking medications regularly: No    Medication side effects: none  Diet: diabetic      She has had an increase in appetite since she discontinued Citalopram. She will desire sugar filled foods. She is aware that she has to watch her sugar intake with her Diabetes. She would like to have her A1c checked.       Depression and Anxiety Follow-Up    Status since last visit: Worsened Pt stopped taking Celexa, due to not working week.     Other associated symptoms:Pt feels down and comes to tears    Complicating factors:     Significant life event: No     Current substance abuse: None    She has felt sad a lot. She felt depressed some, and has had dark thoughts. She does not think about suicide, but she considers what would happen if she was not here. She does not think she will harm herself. Patient states that her  notices her mood. She denies using Prozac.     Chiropractor  She has been going to the chiropractor for regular visits. She feels better after her visits. She does not want to take pain killers because she wants to know where she has pain in her body due to her Diabetes. She is going to try and get back to the gym. She felt good mentally when she used to go to the gym     Eyes  Patient is going to get new glasses because she melted her frames when she left them in the car.        PHQ-9 SCORE 10/14/2016 2017 2017   Total Score - - -   Total Score MyChart - - 16 (Moderately severe depression)   Total Score 1 5 16     CARLOS EDUARDO-7 SCORE 10/14/2016 2017 2017   Total Score - - -   Total Score - - 12 (moderate anxiety)   Total Score 7 7 12       PHQ-9  English  PHQ-9   Any Language  GAD7        Problem list and histories reviewed & adjusted, as indicated.  Additional history: as documented    Patient Active Problem List   Diagnosis     Type 2 diabetes mellitus with hemoglobin A1c goal of less than 7.0% (H)     Hyperlipidemia LDL goal <130     Generalized anxiety disorder     Stress incontinence in female     Candidal intertrigo     Morbid obesity with BMI of 40.0-44.9, adult (H)     Fatty liver     Restless legs syndrome (RLS)     Low ferritin     LIANA (obstructive sleep apnea)- mild (AHI 14)     Past Surgical History:   Procedure Laterality Date      SECTION  , , ,      TONSILLECTOMY         Social History   Substance Use Topics     Smoking status: Former Smoker     Years: 1.00     Types: Cigarettes     Start date: 1989     Quit date: 1990     Smokeless tobacco: Never Used     Alcohol use Yes      Comment: Very infrequent     Family History   Problem Relation Age of Onset     DIABETES Mother      DIABETES Maternal Grandmother      HEART DISEASE Maternal Grandfather      Asthma No family hx of      C.A.D. No family hx of      Hypertension No family hx of      CEREBROVASCULAR DISEASE No family hx of      Breast Cancer No family hx of      Cancer - colorectal No family hx of      Prostate Cancer No family hx of      Alcohol/Drug No family hx of      Allergies No family hx of      Alzheimer Disease No family hx of      Anesthesia Reaction No family hx of      Arthritis No family hx of      Blood Disease No family hx of      CANCER No family hx of      Cardiovascular No family hx of      Circulatory No family hx of      Congenital Anomalies No family  hx of      Connective Tissue Disorder No family hx of      Depression No family hx of      Endocrine Disease No family hx of      Eye Disorder No family hx of      Coronary Artery Disease No family hx of      Hyperlipidemia No family hx of      Ovarian Cancer No family hx of      Depression/Anxiety No family hx of      Thyroid Disease No family hx of      OSTEOPOROSIS No family hx of      Chemical Addiction No family hx of      Known Genetic Syndrome No family hx of      MENTAL ILLNESS No family hx of      Anxiety Disorder No family hx of      Colon Cancer No family hx of      Other Cancer No family hx of      Substance Abuse No family hx of          Current Outpatient Prescriptions   Medication Sig Dispense Refill     buPROPion (WELLBUTRIN XL) 150 MG 24 hr tablet Take 1 tablet (150 mg) by mouth every morning 30 tablet 1     dulaglutide (TRULICITY) 1.5 MG/0.5ML pen Inject 1.5 mg Subcutaneous every 7 days 6 mL 1     lisinopril (PRINIVIL/ZESTRIL) 5 MG tablet TAKE ONE TABLET BY MOUTH ONE TIME DAILY 90 tablet 0     Pediatric Multivit-Minerals-C (FLINTSTONES COMPLETE PO)        Calcium Citrate-Vitamin D (CALCIUM + D PO) Reported on 5/8/2017       nystatin-triamcinolone (MYCOLOG II) cream Apply to affected area BID up to 7 days prn rash 30 g 3     blood glucose monitoring (NO BRAND SPECIFIED) test strip Use to test blood sugar four times daily or as directed.  One touch ultra mini system 12 Box 4     simvastatin (ZOCOR) 40 MG tablet Take 1 tablet (40 mg) by mouth At Bedtime 90 tablet 3     blood glucose meter (NO BRAND SPECIFIED) meter device kit Use to test blood sugar four times daily or as directed.    Get meter that is covered by insurance 1 kit 0     IBUPROFEN PO        citalopram (CELEXA) 40 MG tablet Take 1 tablet (40 mg) by mouth daily (Patient not taking: Reported on 8/14/2017) 90 tablet 3     ASPIRIN NOT PRESCRIBED (INTENTIONAL) continuous prn for other Antiplatelet medication not prescribed intentionally due to  "Not indicated based on age           ROS:  Constitutional, neuro, ENT, endocrine, pulmonary, cardiac, gastrointestinal, genitourinary, musculoskeletal, integument and psychiatric systems are negative, except as otherwise noted.    This document serves as a record of the services and decisions personally performed and made by Adriana Wade DNP. It was created on her behalf by Kylee Holland, a trained medical scribe. The creation of this document is based on the provider's statements to the medical scribe.  Kylee Holland 10:22 AM August 14, 2017    OBJECTIVE:                                                    /78 (BP Location: Left arm, Patient Position: Sitting, Cuff Size: Adult Large)  Pulse 80  Temp 97  F (36.1  C) (Temporal)  Ht 4' 11.84\" (1.52 m)  Wt 207 lb (93.9 kg)  SpO2 99%  BMI 40.64 kg/m2  Body mass index is 40.64 kg/(m^2).  GENERAL APPEARANCE: healthy, alert and no distress  HENT: ear canals and TM's normal and nose and mouth without ulcers or lesions  NECK: no adenopathy, no asymmetry, masses, or scars and thyroid normal to palpation  RESP: lungs clear to auscultation - no rales, rhonchi or wheezes  CV: regular rates and rhythm, normal S1 S2, no S3 or S4 and no murmur, click or rub  NEURO: Normal strength and tone, mentation intact and speech normal  PSYCH: mentation appears normal and affect normal/bright    Diagnostic test results:  Diagnostic Test Results:  No results found for this or any previous visit (from the past 24 hour(s)).       ASSESSMENT/PLAN:                                                        ICD-10-CM    1. Generalized anxiety disorder F41.1 buPROPion (WELLBUTRIN XL) 150 MG 24 hr tablet   2. Major depressive disorder, recurrent episode, moderate (H) F33.1 buPROPion (WELLBUTRIN XL) 150 MG 24 hr tablet   3. Type 2 diabetes mellitus with hemoglobin A1c goal of less than 7.0% (H) E11.9      Discussed current anxiety and dark thoughts. Treating with Wellbutrin XL. Advised to take " dose every other day for 3-4 days, and then start every day. Encouraged patient that dark thoughts should go away.     Continue Type 2 Diabetes care.  Patient has an increase in appetite since she discontinued citalopram. Counseled on weight management. Encouraged her to stay active and eat a healthy diet.    Follow up with Provider - Return to clinic in 4 weeks for a Physical and Pap Smear.      All questions invited, asked and answered to the patient's apparent satisfaction.  Patient agrees to plan.     The information in this document, created by the medical scribe for me, accurately reflects the services I personally performed and the decisions made by me. I have reviewed and approved this document for accuracy prior to leaving the patient care area.  August 14, 2017 10:22 AM    OBEY [facility]  Answers for HPI/ROS submitted by the patient on 8/14/2017   If you checked off any problems, how difficult have these problems made it for you to do your work, take care of things at home, or get along with other people?: Very difficult  PHQ9 TOTAL SCORE: 16  CARLOS EDUARDO 7 TOTAL SCORE: 12

## 2017-08-14 ENCOUNTER — OFFICE VISIT (OUTPATIENT)
Dept: FAMILY MEDICINE | Facility: CLINIC | Age: 37
End: 2017-08-14
Payer: COMMERCIAL

## 2017-08-14 VITALS
OXYGEN SATURATION: 99 % | WEIGHT: 207 LBS | TEMPERATURE: 97 F | DIASTOLIC BLOOD PRESSURE: 78 MMHG | HEART RATE: 80 BPM | BODY MASS INDEX: 40.64 KG/M2 | HEIGHT: 60 IN | SYSTOLIC BLOOD PRESSURE: 118 MMHG

## 2017-08-14 DIAGNOSIS — F41.1 GENERALIZED ANXIETY DISORDER: Primary | Chronic | ICD-10-CM

## 2017-08-14 DIAGNOSIS — E11.9 TYPE 2 DIABETES MELLITUS WITH HEMOGLOBIN A1C GOAL OF LESS THAN 7.0% (H): Chronic | ICD-10-CM

## 2017-08-14 DIAGNOSIS — F33.1 MAJOR DEPRESSIVE DISORDER, RECURRENT EPISODE, MODERATE (H): ICD-10-CM

## 2017-08-14 PROCEDURE — 99214 OFFICE O/P EST MOD 30 MIN: CPT | Performed by: NURSE PRACTITIONER

## 2017-08-14 RX ORDER — BUPROPION HYDROCHLORIDE 150 MG/1
150 TABLET ORAL EVERY MORNING
Qty: 30 TABLET | Refills: 1 | Status: SHIPPED | OUTPATIENT
Start: 2017-08-14 | End: 2017-09-22

## 2017-08-14 ASSESSMENT — PATIENT HEALTH QUESTIONNAIRE - PHQ9
SUM OF ALL RESPONSES TO PHQ QUESTIONS 1-9: 16
10. IF YOU CHECKED OFF ANY PROBLEMS, HOW DIFFICULT HAVE THESE PROBLEMS MADE IT FOR YOU TO DO YOUR WORK, TAKE CARE OF THINGS AT HOME, OR GET ALONG WITH OTHER PEOPLE: VERY DIFFICULT
SUM OF ALL RESPONSES TO PHQ QUESTIONS 1-9: 16

## 2017-08-14 ASSESSMENT — ANXIETY QUESTIONNAIRES
7. FEELING AFRAID AS IF SOMETHING AWFUL MIGHT HAPPEN: MORE THAN HALF THE DAYS
3. WORRYING TOO MUCH ABOUT DIFFERENT THINGS: MORE THAN HALF THE DAYS
7. FEELING AFRAID AS IF SOMETHING AWFUL MIGHT HAPPEN: MORE THAN HALF THE DAYS
2. NOT BEING ABLE TO STOP OR CONTROL WORRYING: MORE THAN HALF THE DAYS
4. TROUBLE RELAXING: SEVERAL DAYS
GAD7 TOTAL SCORE: 12
GAD7 TOTAL SCORE: 12
1. FEELING NERVOUS, ANXIOUS, OR ON EDGE: MORE THAN HALF THE DAYS
6. BECOMING EASILY ANNOYED OR IRRITABLE: MORE THAN HALF THE DAYS
GAD7 TOTAL SCORE: 12
5. BEING SO RESTLESS THAT IT IS HARD TO SIT STILL: SEVERAL DAYS

## 2017-08-14 ASSESSMENT — PAIN SCALES - GENERAL: PAINLEVEL: NO PAIN (0)

## 2017-08-14 NOTE — NURSING NOTE
"Chief Complaint   Patient presents with     Recheck Medication     Panel Management     DM eye exam, PHQ9, CARLOS EDUARDO       Initial /78 (BP Location: Left arm, Patient Position: Sitting, Cuff Size: Adult Large)  Pulse 80  Temp 97  F (36.1  C) (Temporal)  Ht 4' 11.84\" (1.52 m)  Wt 207 lb (93.9 kg)  SpO2 99%  BMI 40.64 kg/m2 Estimated body mass index is 40.64 kg/(m^2) as calculated from the following:    Height as of this encounter: 4' 11.84\" (1.52 m).    Weight as of this encounter: 207 lb (93.9 kg).  Medication Reconciliation: complete   Darcie Castro MA  August 14, 2017      "

## 2017-08-14 NOTE — MR AVS SNAPSHOT
"              After Visit Summary   8/14/2017    Mark Quick    MRN: [id number]           Patient Information     Date Of Birth          1980        Visit Information        Provider Department      8/14/2017 9:40 AM Adriana Wade APRN CNP [facility]        Today's Diagnoses     Generalized anxiety disorder    -  1    Major depressive disorder, recurrent episode, moderate (H)        Type 2 diabetes mellitus with hemoglobin A1c goal of less than 7.0% (H)           Follow-ups after your visit        Who to contact     If you have questions or need follow up information about today's clinic visit or your schedule please contact [facility] directly at [phone].  Normal or non-critical lab and imaging results will be communicated to you by MyChart, letter or phone within 4 business days after the clinic has received the results. If you do not hear from us within 7 days, please contact the clinic through [employer] or phone. If you have a critical or abnormal lab result, we will notify you by phone as soon as possible.  Submit refill requests through [employer] or call your pharmacy and they will forward the refill request to us. Please allow 3 business days for your refill to be completed.          Additional Information About Your Visit        MyChart Information     [employer] gives you secure access to your electronic health record. If you see a primary care provider, you can also send messages to your care team and make appointments. If you have questions, please call your primary care clinic.  If you do not have a primary care provider, please call [phone] and they will assist you.        Care EveryWhere ID     This is your Care EveryWhere ID. This could be used by other organizations to access your [facility] medical records  [id number]        Your Vitals Were     Pulse Temperature Height Pulse Oximetry BMI (Body Mass Index)       80 97  F (36.1  C) (Temporal) 4' 11.84\" (1.52 m) 99% " 40.64 kg/m2        Blood Pressure from Last 3 Encounters:   08/14/17 118/78   05/08/17 118/78   12/21/16 116/72    Weight from Last 3 Encounters:   08/14/17 207 lb (93.9 kg)   06/06/17 206 lb (93.4 kg)   05/08/17 206 lb 12.8 oz (93.8 kg)              Today, you had the following     No orders found for display         Today's Medication Changes          These changes are accurate as of: 8/14/17 11:59 PM.  If you have any questions, ask your nurse or doctor.               Start taking these medicines.        Dose/Directions    buPROPion 150 MG 24 hr tablet   Commonly known as:  WELLBUTRIN XL   Used for:  Generalized anxiety disorder, Major depressive disorder, recurrent episode, moderate (H)   Started by:  Adriana Wade APRN CNP        Dose:  150 mg   Take 1 tablet (150 mg) by mouth every morning   Quantity:  30 tablet   Refills:  1            Where to get your medicines      These medications were sent to [place] [facility] [facility] [place] - [address]  [address] [facility] [address]     Phone:  [phone]     buPROPion 150 MG 24 hr tablet                Primary Care Provider Office Phone # Fax #    OBEY Cabello [phone] [address] [facility] [address] [facility]  [place] [address]        Equal Access to Services     [facility] AH: Hadii aad ku hadasho Soomaali, waaxda luqadaha, qaybta kaalmada adeegyada, waxay fletcher haybrandon estrada. So [facility] [phone].    ATENCIÓN: Si habla español, tiene a chew disposición servicios gratuitos de asistencia lingüística. Llame al [phone].    We comply with applicable federal civil rights laws and [place] laws. We do not discriminate on the basis of race, color, national origin, age, disability sex, sexual orientation or gender identity.            Thank you!     Thank you for choosing [facility]  for your care. Our goal is always to provide you with excellent care. Hearing back from our patients is one way we can  continue to improve our services. Please take a few minutes to complete the written survey that you may receive in the mail after your visit with us. Thank you!             Your Updated Medication List - Protect others around you: Learn how to safely use, store and throw away your medicines at www.disposemymeds.org.          This list is accurate as of: 8/14/17 11:59 PM.  Always use your most recent med list.                   Brand Name Dispense Instructions for use Diagnosis    ASPIRIN NOT PRESCRIBED    INTENTIONAL     continuous prn for other Antiplatelet medication not prescribed intentionally due to Not indicated based on age        blood glucose monitoring meter device kit    no brand specified    1 kit    Use to test blood sugar four times daily or as directed.  Get meter that is covered by insurance    Type II or unspecified type diabetes mellitus without mention of complication, not stated as uncontrolled       blood glucose monitoring test strip    no brand specified    12 Box    Use to test blood sugar four times daily or as directed. One touch ultra mini system    Type 2 diabetes mellitus with complication (H)       buPROPion 150 MG 24 hr tablet    WELLBUTRIN XL    30 tablet    Take 1 tablet (150 mg) by mouth every morning    Generalized anxiety disorder, Major depressive disorder, recurrent episode, moderate (H)       CALCIUM + D PO      Reported on 5/8/2017        citalopram 40 MG tablet    celeXA    90 tablet    Take 1 tablet (40 mg) by mouth daily    Type 2 diabetes mellitus with complication (H)       dulaglutide 1.5 MG/0.5ML pen    TRULICITY    6 mL    Inject 1.5 mg Subcutaneous every 7 days    Type 2 diabetes mellitus with hemoglobin A1c goal of less than 7.0% (H)       FLINTSTONES COMPLETE PO           IBUPROFEN PO           lisinopril 5 MG tablet    PRINIVIL/ZESTRIL    90 tablet    TAKE ONE TABLET BY MOUTH ONE TIME DAILY    Type 2 diabetes mellitus with hemoglobin A1c goal of less than 7.0% (H)        nystatin-triamcinolone cream    MYCOLOG II    30 g    Apply to affected area BID up to 7 days prn rash    Candidal intertrigo       simvastatin 40 MG tablet    ZOCOR    90 tablet    Take 1 tablet (40 mg) by mouth At Bedtime    Hyperlipidemia with target LDL less than 130, Diabetes mellitus, type 2 (H)

## 2017-08-15 ASSESSMENT — PATIENT HEALTH QUESTIONNAIRE - PHQ9: SUM OF ALL RESPONSES TO PHQ QUESTIONS 1-9: 16

## 2017-08-15 ASSESSMENT — ANXIETY QUESTIONNAIRES: GAD7 TOTAL SCORE: 12

## 2017-09-13 DIAGNOSIS — E11.9 DIABETES MELLITUS, TYPE 2 (H): ICD-10-CM

## 2017-09-13 DIAGNOSIS — E78.5 HYPERLIPIDEMIA WITH TARGET LDL LESS THAN 130: ICD-10-CM

## 2017-09-13 NOTE — TELEPHONE ENCOUNTER
zocor     Last Written Prescription Date: 05/17/16  Last Fill Quantity: 90, # refills: 3  Last Office Visit with [facility], [facility] or  Health prescribing provider: 08/14/17  Next 5 appointments (look out 90 days)     Sep 22, 2017  4:20 PM CDT   Office [facility] with O[facility] Cabello CNP   [facility]    [address], [address]  [facility] [address]   [phone]                   Lab Results   Component Value Date    CHOL 193 05/08/2017     Lab Results   Component Value Date    HDL 52 05/08/2017     Lab Results   Component Value Date    LDL 83 05/08/2017     Lab Results   Component Value Date    TRIG 290 05/08/2017     Lab Results   Component Value Date    CHOLHDLRATIO 3.7 09/30/2015

## 2017-09-15 ENCOUNTER — TRANSFERRED RECORDS (OUTPATIENT)
Dept: HEALTH INFORMATION MANAGEMENT | Facility: CLINIC | Age: 37
End: 2017-09-15

## 2017-09-18 RX ORDER — SIMVASTATIN 40 MG
TABLET ORAL
Qty: 90 TABLET | Refills: 2 | Status: SHIPPED | OUTPATIENT
Start: 2017-09-18 | End: 2018-05-04

## 2017-09-18 NOTE — TELEPHONE ENCOUNTER
Prescription approved per [facility] Refill [facility].  Wil Murillo, RN, BSN

## 2017-09-18 NOTE — PROGRESS NOTES
SUBJECTIVE:                                                    Mark Quick is a 36 year old female who presents to clinic today for the following health issues:      Depression and Anxiety Follow-Up    Status since last visit: Improved- Patient states she feels good. She has had a couple panic attacks but with starting a new job and being sick she states she is doing good.    Other associated symptoms:None    Complicating factors:     Significant life event: Yes-  New job     Current substance abuse: None    Patient states that she got a new job as a  and . She is very happy with this new job. She works 7:30-4:30pm Monday through Friday. It pays well, but she will get benefits later on. She is working through an agency on a contract.  She also gets holidays and weekends off. Patient feels that she gets to enjoy her family in a way that she hasn't before. She has had a few panic attacks, however she is able to manage them. She feels that her anxiety is better.     PHQ-9 SCORE 5/8/2017 8/14/2017 9/22/2017   Total Score - - -   Total Score MyChart - 16 (Moderately severe depression) 3 (Minimal depression)   Total Score 5 16 3     CARLOS EDUARDO-7 SCORE 5/8/2017 8/14/2017 9/22/2017   Total Score - - -   Total Score - 12 (moderate anxiety) 4 (minimal anxiety)   Total Score 7 12 4       PHQ-9  English  PHQ-9   Any Language  GAD7    Diabetes  She relates that she is not eating junk food She is bringing her lunch to work. Patient is starting to meal prep on Sunday before the week.         Amount of exercise or physical activity: None    Problems taking medications regularly: No    Medication side effects: none  Diet: diabetic          Problem list and histories reviewed & adjusted, as indicated.  Additional history: as documented    Patient Active Problem List   Diagnosis     Type 2 diabetes mellitus with hemoglobin A1c goal of less than 7.0% (H)     Hyperlipidemia LDL goal <130     Generalized anxiety  disorder     Stress incontinence in female     Candidal intertrigo     Morbid obesity with BMI of 40.0-44.9, adult (H)     Fatty liver     Restless legs syndrome (RLS)     Low ferritin     LIANA (obstructive sleep apnea)- mild (AHI 14)     Past Surgical History:   Procedure Laterality Date      SECTION  , , ,      TONSILLECTOMY         Social History   Substance Use Topics     Smoking status: Former Smoker     Years: 1.00     Types: Cigarettes     Start date: 1989     Quit date: 1990     Smokeless tobacco: Never Used     Alcohol use Yes      Comment: Very infrequent     Family History   Problem Relation Age of Onset     DIABETES Mother      DIABETES Maternal Grandmother      HEART DISEASE Maternal Grandfather      Asthma No family hx of      C.A.D. No family hx of      Hypertension No family hx of      CEREBROVASCULAR DISEASE No family hx of      Breast Cancer No family hx of      Cancer - colorectal No family hx of      Prostate Cancer No family hx of      Alcohol/Drug No family hx of      Allergies No family hx of      Alzheimer Disease No family hx of      Anesthesia Reaction No family hx of      Arthritis No family hx of      Blood Disease No family hx of      CANCER No family hx of      Cardiovascular No family hx of      Circulatory No family hx of      Congenital Anomalies No family hx of      Connective Tissue Disorder No family hx of      Depression No family hx of      Endocrine Disease No family hx of      Eye Disorder No family hx of      Coronary Artery Disease No family hx of      Hyperlipidemia No family hx of      Ovarian Cancer No family hx of      Depression/Anxiety No family hx of      Thyroid Disease No family hx of      OSTEOPOROSIS No family hx of      Chemical Addiction No family hx of      Known Genetic Syndrome No family hx of      MENTAL ILLNESS No family hx of      Anxiety Disorder No family hx of      Colon Cancer No family hx of      Other Cancer No  "family hx of      Substance Abuse No family hx of          Current Outpatient Prescriptions   Medication Sig Dispense Refill     buPROPion (WELLBUTRIN XL) 150 MG 24 hr tablet Take 1 tablet (150 mg) by mouth every morning 90 tablet 2     lisinopril (PRINIVIL/ZESTRIL) 5 MG tablet Take 1 tablet (5 mg) by mouth daily 90 tablet 3     simvastatin (ZOCOR) 40 MG tablet TAKE ONE TABLET BY MOUTH AT BEDTIME 90 tablet 2     dulaglutide (TRULICITY) 1.5 MG/0.5ML pen Inject 1.5 mg Subcutaneous every 7 days 6 mL 1     Pediatric Multivit-Minerals-C (FLINTSTONES COMPLETE PO)        Calcium Citrate-Vitamin D (CALCIUM + D PO) Reported on 5/8/2017       blood glucose monitoring (NO BRAND SPECIFIED) test strip Use to test blood sugar four times daily or as directed.  One touch ultra mini system 12 Box 4     blood glucose meter (NO BRAND SPECIFIED) meter device kit Use to test blood sugar four times daily or as directed.    Get meter that is covered by insurance 1 kit 0     nystatin-triamcinolone (MYCOLOG II) cream Apply to affected area BID up to 7 days prn rash (Patient not taking: Reported on 9/22/2017) 30 g 3     ASPIRIN NOT PRESCRIBED (INTENTIONAL) continuous prn for other Antiplatelet medication not prescribed intentionally due to Not indicated based on age       IBUPROFEN PO            ROS:  Constitutional, neuro, ENT, endocrine, pulmonary, cardiac, gastrointestinal, genitourinary, musculoskeletal, integument and psychiatric systems are negative, except as otherwise noted.    This document serves as a record of the services and decisions personally performed and made by Adriana Wade DNP. It was created on her behalf by Kylee Holland, a trained medical scribe. The creation of this document is based on the provider's statements to the medical scribe.  Kylee Holland 4:48 PM September 22, 2017    OBJECTIVE:                                                    /82  Pulse 80  Temp 99  F (37.2  C) (Temporal)  Resp 16  Ht 4' 11.75\" " (1.518 m)  Wt 212 lb 9.6 oz (96.4 kg)  LMP 09/18/2017 (Exact Date)  Breastfeeding? No  BMI 41.87 kg/m2  Body mass index is 41.87 kg/(m^2).  GENERAL APPEARANCE: healthy, alert and no distress  HENT: ear canals and TM's normal and nose and mouth without ulcers or lesions  NECK: no adenopathy, no asymmetry, masses, or scars and thyroid normal to palpation  RESP: lungs clear to auscultation - no rales, rhonchi or wheezes  CV: regular rates and rhythm, normal S1 S2, no S3 or S4 and no murmur, click or rub  NEURO: Normal strength and tone, mentation intact and speech normal  PSYCH: mentation appears normal and affect normal/bright    Diagnostic test results:  Diagnostic Test Results:  No results found for this or any previous visit (from the past 24 hour(s)).       ASSESSMENT/PLAN:                                                        ICD-10-CM    1. Need for prophylactic vaccination and inoculation against influenza Z23 FLU VAC, SPLIT VIRUS IM > 3 YO (QUADRIVALENT) [18954]     Vaccine Administration, Initial [10045]   2. Generalized anxiety disorder F41.1 buPROPion (WELLBUTRIN XL) 150 MG 24 hr tablet   3. Major depressive disorder, recurrent episode, moderate (H) F33.1 buPROPion (WELLBUTRIN XL) 150 MG 24 hr tablet   4. Type 2 diabetes mellitus with hemoglobin A1c goal of less than 7.0% (H) E11.9 lisinopril (PRINIVIL/ZESTRIL) 5 MG tablet     Hemoglobin A1c     Patient's anxiety is controlled with Wellbutrin XL 150mg. She is happy with her new job. She is managing her few panic attacks. Refilled Wellbutrin XL 150mg.     Encouraged patient to eat a healthy diet, and exercise. Will continue treatment with Lisinopril 5mg. Refilled Lisinopril 5mg. Order placed for labs that the patient will complete today. Will notify with results. BP is controlled.   Pt. Still working on weight loss.     Flu shot given today.    Consulting with [facility] for uncontrolled A1c.     Follow up with Provider - Return to clinic in 3 months for  medication check    All questions invited, asked and answered to the patient's apparent satisfaction.  Patient agrees to plan.      The information in this document, created by the medical scribe for me, accurately reflects the services I personally performed and the decisions made by me. I have reviewed and approved this document for accuracy prior to leaving the patient care area.  September 22, 2017 4:48 PM    OBEY Calvo [facility] [place]  Answers for HPI/ROS submitted by the patient on 9/22/2017   If you checked off any problems, how difficult have these problems made it for you to do your work, take care of things at home, or get along with other people?: Not difficult at all  PHQ9 TOTAL SCORE: 3  CARLOS EDUARDO 7 TOTAL SCORE: 4

## 2017-09-22 ENCOUNTER — OFFICE VISIT (OUTPATIENT)
Dept: FAMILY MEDICINE | Facility: CLINIC | Age: 37
End: 2017-09-22
Payer: COMMERCIAL

## 2017-09-22 VITALS
SYSTOLIC BLOOD PRESSURE: 116 MMHG | BODY MASS INDEX: 41.74 KG/M2 | RESPIRATION RATE: 16 BRPM | TEMPERATURE: 99 F | HEIGHT: 60 IN | DIASTOLIC BLOOD PRESSURE: 82 MMHG | HEART RATE: 80 BPM | WEIGHT: 212.6 LBS

## 2017-09-22 DIAGNOSIS — F33.1 MAJOR DEPRESSIVE DISORDER, RECURRENT EPISODE, MODERATE (H): ICD-10-CM

## 2017-09-22 DIAGNOSIS — E11.9 TYPE 2 DIABETES MELLITUS WITH HEMOGLOBIN A1C GOAL OF LESS THAN 7.0% (H): ICD-10-CM

## 2017-09-22 DIAGNOSIS — F41.1 GENERALIZED ANXIETY DISORDER: Chronic | ICD-10-CM

## 2017-09-22 DIAGNOSIS — Z23 NEED FOR PROPHYLACTIC VACCINATION AND INOCULATION AGAINST INFLUENZA: Primary | ICD-10-CM

## 2017-09-22 LAB — HBA1C MFR BLD: 8.1 % (ref 4.3–6)

## 2017-09-22 PROCEDURE — 99214 OFFICE O/P EST MOD 30 MIN: CPT | Mod: 25 | Performed by: NURSE PRACTITIONER

## 2017-09-22 PROCEDURE — 83036 HEMOGLOBIN GLYCOSYLATED A1C: CPT | Performed by: NURSE PRACTITIONER

## 2017-09-22 PROCEDURE — 90471 IMMUNIZATION ADMIN: CPT | Performed by: NURSE PRACTITIONER

## 2017-09-22 PROCEDURE — 36415 COLL VENOUS BLD VENIPUNCTURE: CPT | Performed by: NURSE PRACTITIONER

## 2017-09-22 PROCEDURE — 90686 IIV4 VACC NO PRSV 0.5 ML IM: CPT | Performed by: NURSE PRACTITIONER

## 2017-09-22 RX ORDER — BUPROPION HYDROCHLORIDE 150 MG/1
150 TABLET ORAL EVERY MORNING
Qty: 90 TABLET | Refills: 2 | Status: SHIPPED | OUTPATIENT
Start: 2017-09-22 | End: 2018-05-04

## 2017-09-22 RX ORDER — LISINOPRIL 5 MG/1
5 TABLET ORAL DAILY
Qty: 90 TABLET | Refills: 3 | Status: SHIPPED | OUTPATIENT
Start: 2017-09-22 | End: 2018-05-04

## 2017-09-22 ASSESSMENT — ANXIETY QUESTIONNAIRES
7. FEELING AFRAID AS IF SOMETHING AWFUL MIGHT HAPPEN: NOT AT ALL
3. WORRYING TOO MUCH ABOUT DIFFERENT THINGS: SEVERAL DAYS
6. BECOMING EASILY ANNOYED OR IRRITABLE: SEVERAL DAYS
2. NOT BEING ABLE TO STOP OR CONTROL WORRYING: SEVERAL DAYS
1. FEELING NERVOUS, ANXIOUS, OR ON EDGE: SEVERAL DAYS
GAD7 TOTAL SCORE: 4
GAD7 TOTAL SCORE: 4
4. TROUBLE RELAXING: NOT AT ALL
GAD7 TOTAL SCORE: 4
7. FEELING AFRAID AS IF SOMETHING AWFUL MIGHT HAPPEN: NOT AT ALL
5. BEING SO RESTLESS THAT IT IS HARD TO SIT STILL: NOT AT ALL

## 2017-09-22 ASSESSMENT — PATIENT HEALTH QUESTIONNAIRE - PHQ9
10. IF YOU CHECKED OFF ANY PROBLEMS, HOW DIFFICULT HAVE THESE PROBLEMS MADE IT FOR YOU TO DO YOUR WORK, TAKE CARE OF THINGS AT HOME, OR GET ALONG WITH OTHER PEOPLE: NOT DIFFICULT AT ALL
SUM OF ALL RESPONSES TO PHQ QUESTIONS 1-9: 3
SUM OF ALL RESPONSES TO PHQ QUESTIONS 1-9: 3

## 2017-09-22 ASSESSMENT — PAIN SCALES - GENERAL: PAINLEVEL: NO PAIN (0)

## 2017-09-22 NOTE — Clinical Note
Sahil Woodard,  Her A1c is out of control.  Lab Results      Component                Value               Date                      A1C                      8.1                 09/22/2017                A1C                      6.5                 05/08/2017                A1C                      8.6                 12/21/2016                A1C                      7.1                 07/01/2016                    What should we add next? Thanks,  Adriana

## 2017-09-22 NOTE — LETTER
My Depression Action Plan  Name: Mark Quick   Date of Birth 1980  Date: 9/18/2017    My doctor: Ardiana Wade   My clinic: [facility]  [id number] [place] [address], [address]  [place] [id number] [phone]          GREEN    ZONE   Good Control    What it looks like:     Things are going generally well. You have normal up s and down s. You may even feel depressed from time to time, but bad moods usually last less than a day.   What you need to do:  1. Continue to care for yourself (see self care plan)  2. Check your depression survival kit and update it as needed  3. Follow your physician s recommendations including any medication.  4. Do not stop taking medication unless you consult with your physician first.           YELLOW         ZONE Getting Worse    What it looks like:     Depression is starting to interfere with your life.     It may be hard to get out of bed; you may be starting to isolate yourself from others.    Symptoms of depression are starting to last most all day and this has happened for several days.     You may have suicidal thoughts but they are not constant.   What you need to do:     1. Call your care team, your response to treatment will improve if you keep your care team informed of your progress. Yellow periods are signs an adjustment may need to be made.     2. Continue your self-care, even if you have to fake it!    3. Talk to someone in your support network    4. Open up your depression survival kit           RED    ZONE Medical Alert - Get Help    What it looks like:     Depression is seriously interfering with your life.     You may experience these or other symptoms: You can t get out of bed most days, can t work or engage in other necessary activities, you have trouble taking care of basic hygiene, or basic responsibilities, thoughts of suicide or death that will not go away, self-injurious behavior.     What you need to do:  1. Call your care team and  request a same-day appointment. If they are not available (weekends or after hours) call your local crisis line, emergency room or 911.      Electronically signed by: Yumiko Chance, September 18, 2017    Depression Self Care Plan / Survival Kit    Self-Care for Depression  Here s the deal. Your body and mind are really not as separate as most people think.  What you do and think affects how you feel and how you feel influences what you do and think. This means if you do things that people who feel good do, it will help you feel better.  Sometimes this is all it takes.  There is also a place for medication and therapy depending on how severe your depression is, so be sure to consult with your medical provider and/ or Behavioral Health Consultant if your symptoms are worsening or not improving.     In order to better manage my stress, I will:    Exercise  Get some form of exercise, every day. This will help reduce pain and release endorphins, the  feel good  chemicals in your brain. This is almost as good as taking antidepressants!  This is not the same as joining a gym and then never going! (they count on that by the way ) It can be as simple as just going for a walk or doing some gardening, anything that will get you moving.      Hygiene   Maintain good hygiene (Get out of bed in the morning, Make your bed, Brush your teeth, Take a shower, and Get dressed like you were going to work, even if you are unemployed).  If your clothes don't fit try to get ones that do.    Diet  I will strive to eat foods that are good for me, drink plenty of water, and avoid excessive sugar, caffeine, alcohol, and other mood-altering substances.  Some foods that are helpful in depression are: complex carbohydrates, B vitamins, flaxseed, fish or fish oil, fresh fruits and vegetables.    Psychotherapy  I agree to participate in Individual Therapy (if recommended).    Medication  If prescribed medications, I agree to take them.  Missing  doses can result in serious side effects.  I understand that drinking alcohol, or other illicit drug use, may cause potential side effects.  I will not stop my medication abruptly without first discussing it with my provider.    Staying Connected With Others  I will stay in touch with my friends, family members, and my primary care provider/team.    Use your imagination  Be creative.  We all have a creative side; it doesn t matter if it s oil painting, sand castles, or mud pies! This will also kick up the endorphins.    Witness Beauty  (AKA stop and smell the roses) Take a look outside, even in mid-winter. Notice colors, textures. Watch the squirrels and birds.     Service to others  Be of service to others.  There is always someone else in need.  By helping others we can  get out of ourselves  and remember the really important things.  This also provides opportunities for practicing all the other parts of the program.    Humor  Laugh and be silly!  Adjust your TV habits for less news and crime-drama and more comedy.    Control your stress  Try breathing deep, massage therapy, biofeedback, and meditation. Find time to relax each day.     My support system    Clinic Contact:  Phone number:    Contact 1:  Phone number:    Contact 2:  Phone number:    [employer]/:  Phone number:    Therapist:  Phone number:    Local crisis center:    Phone number:    Other community support:  Phone number:

## 2017-09-22 NOTE — MR AVS SNAPSHOT
After Visit Summary   9/22/2017    Mark Quick    MRN: [id number]           Patient Information     Date Of Birth          1980        Visit Information        Provider Department      9/22/2017 4:20 PM Adriana Wade APRN CNP [facility]        Today's Diagnoses     Need for prophylactic vaccination and inoculation against influenza    -  1    Generalized anxiety disorder        Major depressive disorder, recurrent episode, moderate (H)        Type 2 diabetes mellitus with hemoglobin A1c goal of less than 7.0% (H)           Follow-ups after your visit        Who to contact     If you have questions or need follow up information about today's clinic visit or your schedule please contact [facility] directly at [phone].  Normal or non-critical lab and imaging results will be communicated to you by E-LeatherGrouphart, letter or phone within 4 business days after the clinic has received the results. If you do not hear from us within 7 days, please contact the clinic through E-LeatherGrouphart or phone. If you have a critical or abnormal lab result, we will notify you by phone as soon as possible.  Submit refill requests through Tile or call your pharmacy and they will forward the refill request to us. Please allow 3 business days for your refill to be completed.          Additional Information About Your Visit        MyChart Information     Tile gives you secure access to your electronic health record. If you see a primary care provider, you can also send messages to your care team and make appointments. If you have questions, please call your primary care clinic.  If you do not have a primary care provider, please call [phone] and they will assist you.        Care EveryWhere ID     This is your Care EveryWhere ID. This could be used by other organizations to access your [facility] medical records  [id number]        Your Vitals Were     Pulse Temperature Respirations Height Last  "Period Breastfeeding?    80 99  F (37.2  C) (Temporal) 16 4' 11.75\" (1.518 m) 09/18/2017 (Exact Date) No    BMI (Body Mass Index)                   41.87 kg/m2            Blood Pressure from Last 3 Encounters:   09/22/17 116/82   08/14/17 118/78   05/08/17 118/78    Weight from Last 3 Encounters:   09/22/17 212 lb 9.6 oz (96.4 kg)   08/14/17 207 lb (93.9 kg)   06/06/17 206 lb (93.4 kg)              We Performed the Following     DEPRESSION ACTION PLAN (DAP)     FLU VAC, SPLIT VIRUS IM > 3 YO (QUADRIVALENT) [38157]     Hemoglobin A1c     Vaccine Administration, Initial [86255]          Today's Medication Changes          These changes are accurate as of: 9/22/17 11:59 PM.  If you have any questions, ask your nurse or doctor.               These medicines have changed or have updated prescriptions.        Dose/Directions    lisinopril 5 MG tablet   Commonly known as:  PRINIVIL/ZESTRIL   This may have changed:  See the new instructions.   Used for:  Type 2 diabetes mellitus with hemoglobin A1c goal of less than 7.0% (H)   Changed by:  Adriana Wade APRN CNP        Dose:  5 mg   Take 1 tablet (5 mg) by mouth daily   Quantity:  90 tablet   Refills:  3            Where to get your medicines      These medications were sent to [facility]/pharmacy #[id number] - [place], [place] - [address]   [address], [place] [address]     Phone:  [phone]     buPROPion 150 MG 24 hr tablet    lisinopril 5 MG tablet                Primary Care Provider Office Phone # Fax #    OBEY Cabello [phone] [address] [address]        Equal Access to Services     MAJO PATEL AH: Velvet Batista, naga correia, carlee kaalmada irene, brian estrada. So [facility] [phone].    ATENCIÓN: Si habla español, tiene a chew disposición servicios gratuitos de asistencia lingüística. Lisa al [phone].    We comply with applicable federal civil rights " laws and [place] laws. We do not discriminate on the basis of race, color, national origin, age, disability sex, sexual orientation or gender identity.            Thank you!     Thank you for choosing [facility]  for your care. Our goal is always to provide you with excellent care. Hearing back from our patients is one way we can continue to improve our services. Please take a few minutes to complete the written survey that you may receive in the mail after your visit with us. Thank you!             Your Updated Medication List - Protect others around you: Learn how to safely use, store and throw away your medicines at www.disposemymeds.org.          This list is accurate as of: 9/22/17 11:59 PM.  Always use your most recent med list.                   Brand Name Dispense Instructions for use Diagnosis    ASPIRIN NOT PRESCRIBED    INTENTIONAL     continuous prn for other Antiplatelet medication not prescribed intentionally due to Not indicated based on age        blood glucose monitoring meter device kit    no brand specified    1 kit    Use to test blood sugar four times daily or as directed.  Get meter that is covered by insurance    Type II or unspecified type diabetes mellitus without mention of complication, not stated as uncontrolled       blood glucose monitoring test strip    no brand specified    12 Box    Use to test blood sugar four times daily or as directed. One touch ultra mini system    Type 2 diabetes mellitus with complication (H)       buPROPion 150 MG 24 hr tablet    WELLBUTRIN XL    90 tablet    Take 1 tablet (150 mg) by mouth every morning    Generalized anxiety disorder, Major depressive disorder, recurrent episode, moderate (H)       CALCIUM + D PO      Reported on 5/8/2017        dulaglutide 1.5 MG/0.5ML pen    TRULICITY    6 mL    Inject 1.5 mg Subcutaneous every 7 days    Type 2 diabetes mellitus with hemoglobin A1c goal of less than 7.0% (H)       FLINTSTONES COMPLETE PO            IBUPROFEN PO           lisinopril 5 MG tablet    PRINIVIL/ZESTRIL    90 tablet    Take 1 tablet (5 mg) by mouth daily    Type 2 diabetes mellitus with hemoglobin A1c goal of less than 7.0% (H)       nystatin-triamcinolone cream    MYCOLOG II    30 g    Apply to affected area BID up to 7 days prn rash    Candidal intertrigo       simvastatin 40 MG tablet    ZOCOR    90 tablet    TAKE ONE TABLET BY MOUTH AT BEDTIME    Hyperlipidemia with target LDL less than 130, Diabetes mellitus, type 2 (H)

## 2017-09-22 NOTE — NURSING NOTE
"Chief Complaint   Patient presents with     Panel Management     phq9, cliff, DAP, DM eye exam, flu     Anxiety       Initial /82  Pulse 80  Temp 99  F (37.2  C) (Temporal)  Resp 16  Ht 4' 11.75\" (1.518 m)  Wt 212 lb 9.6 oz (96.4 kg)  LMP 09/18/2017 (Exact Date)  Breastfeeding? No  BMI 41.87 kg/m2 Estimated body mass index is 41.87 kg/(m^2) as calculated from the following:    Height as of this encounter: 4' 11.75\" (1.518 m).    Weight as of this encounter: 212 lb 9.6 oz (96.4 kg).  Medication Reconciliation: complete    "

## 2017-09-22 NOTE — NURSING NOTE
Prior to injection verified patient identity using patient's name and date of birth.  Injectable Influenza Immunization Documentation    1.  Are you sick today? (Fever of 100.5 or higher on the day of the clinic)   No    2.  Have you ever had Guillain-Yakima Syndrome within 6 weeks of an influenza vaccionation?  No    3. Do you have a life-threatening allergy to eggs?  No    4. Do you have a life-threatening allergy to a component of the vaccine? May include antibiotics, gelatin or latex.  No     5. Have you ever had a reaction to a dose of flu vaccine that needed immediate medical attention?  No     Form completed by Malinda Martines MA    Per orders of Adriana Wade, injection of Flu given by Malinda Martines. Patient instructed to remain in clinic for 15 minutes afterwards, and to report any adverse reaction to me immediately.

## 2017-09-23 ASSESSMENT — ANXIETY QUESTIONNAIRES: GAD7 TOTAL SCORE: 4

## 2017-09-23 ASSESSMENT — PATIENT HEALTH QUESTIONNAIRE - PHQ9: SUM OF ALL RESPONSES TO PHQ QUESTIONS 1-9: 3

## 2017-09-26 ENCOUNTER — TELEPHONE (OUTPATIENT)
Dept: FAMILY MEDICINE | Facility: CLINIC | Age: 37
End: 2017-09-26

## 2017-09-26 RX ORDER — GLIPIZIDE 10 MG/1
10 TABLET, FILM COATED, EXTENDED RELEASE ORAL DAILY
Qty: 90 TABLET | Refills: 1 | Status: SHIPPED | OUTPATIENT
Start: 2017-09-26 | End: 2017-12-15 | Stop reason: SINTOL

## 2017-09-26 NOTE — TELEPHONE ENCOUNTER
Call pt.     Follow-up to visit on Friday.   I consulted with MTM-  Add Glipizide daily to oral regimen  They offered a follow-up visit with MTM ( Selma Bashir, PharmD)if she would like, as they would like to see her.   Adriana Waed

## 2017-11-29 ENCOUNTER — TELEPHONE (OUTPATIENT)
Dept: PHARMACY | Facility: CLINIC | Age: 37
End: 2017-11-29

## 2017-11-29 NOTE — TELEPHONE ENCOUNTER
Called patient to schedule f/u MTM appt. LM for patient to return call.    Selma Bashir, Pharm.D, BCACP  Medication Therapy Management Pharmacist

## 2017-12-03 DIAGNOSIS — E11.9 TYPE 2 DIABETES MELLITUS WITH HEMOGLOBIN A1C GOAL OF LESS THAN 7.0% (H): ICD-10-CM

## 2017-12-05 RX ORDER — LISINOPRIL 5 MG/1
TABLET ORAL
Qty: 90 TABLET | Refills: 0 | OUTPATIENT
Start: 2017-12-05

## 2017-12-08 ENCOUNTER — TELEPHONE (OUTPATIENT)
Dept: FAMILY MEDICINE | Facility: CLINIC | Age: 37
End: 2017-12-08

## 2017-12-08 NOTE — TELEPHONE ENCOUNTER
Reason for call:  Form  Reason for Call:  Form, our goal is to have forms completed with 72 hours, however, some forms may require a visit or additional information.    Type of letter, form or note:  medical    Who is the form from?:  [employer]    Where did the form come from: form was faxed in    What clinic location was the form placed at?: [facility] - [phone]    Where the form was placed: 's Box    What number is listed as a contact on the form?:  [phone]       Additional comments:  Fax to [phone]    Prescription for the freestyle heather system - they do not want an electronik Rx. Please complete the form    created by Margarita Wilson

## 2017-12-12 PROBLEM — F32.0 MILD MAJOR DEPRESSION (H): Status: ACTIVE | Noted: 2017-12-12

## 2017-12-13 ENCOUNTER — TELEPHONE (OUTPATIENT)
Dept: FAMILY MEDICINE | Facility: CLINIC | Age: 37
End: 2017-12-13

## 2017-12-13 NOTE — TELEPHONE ENCOUNTER
Summary:    Patient is due/failing the following:   A1C and FOLLOW UP    Action needed:   Patient needs office visit for diabetic follow up. and Patient needs non-fasting lab only appointment    Type of outreach:    phone no answer or voicemail.  Reminder letter sent  Questions for provider review:    None                                                                                                                                    Breana Santamaria       Chart routed to Care Team .    Panel Management Review      Patient has the following on her problem list:     Depression / Dysthymia review    Measure:  Needs PHQ-9 score of 4 or less during index window.  Administer PHQ-9 and if score is 5 or more, send encounter to provider for next steps.        PHQ-9 SCORE 5/8/2017 8/14/2017 9/22/2017   Total Score - - -   Total Score MyChart - 16 (Moderately severe depression) 3 (Minimal depression)   Total Score 5 16 3       If PHQ-9 recheck is 5 or more, route to provider for next steps.    Patient is due for:  PHQ9    Diabetes    ASA:     Last A1C  Lab Results   Component Value Date    A1C 8.1 09/22/2017    A1C 6.5 05/08/2017    A1C 8.6 12/21/2016    A1C 7.1 07/01/2016    A1C 7.9 04/05/2016     A1C tested: FAILED    Last LDL:    Lab Results   Component Value Date    CHOL 193 05/08/2017     Lab Results   Component Value Date    HDL 52 05/08/2017     Lab Results   Component Value Date    LDL 83 05/08/2017     Lab Results   Component Value Date    TRIG 290 05/08/2017     Lab Results   Component Value Date    CHOLHDLRATIO 3.7 09/30/2015     Lab Results   Component Value Date    NHDL 141 05/08/2017       Is the patient on a Statin? YES             Is the patient on Aspirin? NO    Medications     HMG CoA Reductase Inhibitors    simvastatin (ZOCOR) 40 MG tablet          Last three blood pressure readings:  BP Readings from Last 3 Encounters:   09/22/17 116/82   08/14/17 118/78   05/08/17 118/78          Tobacco  History:     History   Smoking Status     Former Smoker     Years: 1.00     Types: Cigarettes     Start date: 1/1/1989     Quit date: 1/1/1990   Smokeless Tobacco     Never Used               Composite cancer screening  Chart review shows that this patient is due/due soon for the following None

## 2017-12-13 NOTE — LETTER
[facility]  [address], [address]  [place] [address]  Phone: [phone]  Fax: [phone]  December 13, 2017      Mark Quick  [address] [address] [address]      Dear Mark,    We care about your health and have reviewed your health plan including your medical conditions, medications, and lab results.  Based on this review, it is recommended that you follow up regarding the following health topic(s):  -Diabetes    We recommend you take the following action(s):  -schedule a FOLLOWUP OFFICE APPOINTMENT.  We will perform the following labs:  A1c.     Please call us at the [facility] - [phone] (or use [employer]) to address the above recommendations.     Thank you for trusting [facility] and we appreciate the opportunity to serve you.  We look forward to supporting your healthcare needs in the future.    Healthy Regards,    Your Health Care Team  [facility]

## 2017-12-15 ENCOUNTER — TELEPHONE (OUTPATIENT)
Dept: FAMILY MEDICINE | Facility: CLINIC | Age: 37
End: 2017-12-15

## 2017-12-15 ENCOUNTER — TRANSFERRED RECORDS (OUTPATIENT)
Dept: HEALTH INFORMATION MANAGEMENT | Facility: CLINIC | Age: 37
End: 2017-12-15

## 2017-12-15 NOTE — TELEPHONE ENCOUNTER
Mark Quick is a 37 year old female who calls with abdominal pain.    NURSING ASSESSMENT:  Description:  I spoke with patient who states she has severe pain in abdomen and watery diarrhea  Onset/duration:  Yesterday  Precip. factors:  DM  Associated symptoms:  Dizziness. BM is watery and hard to pass  Improves/worsens symptoms:  Pain is worse when she bends over  Last exam/Treatment:  UC in [place] about 3 weeks ago for abdominal pain and diarrhea  Allergies:   Allergies   Allergen Reactions     Hydrocodone Nausea     If taking Zofran she can take it.       Percocet [Oxycodone-Acetaminophen] Nausea     If using zofran she can tolerate but prefers not to.         RECOMMENDED DISPOSITION:  To ED/UC for evaluation, Huddled with KL  Will comply with recommendation: Yes  If further questions/concerns or if symptoms do not improve, worsen or new symptoms develop, call your PCP or [facility] as soon as possible.      Guideline used:  Telephone Triage Protocols for Nurses, Fifth Edition, Chayito Lyons RN

## 2017-12-15 NOTE — TELEPHONE ENCOUNTER
Spoke with patient who called to let us know she was seen at [place] ED. They did blood work and told patient they feel like the pain is from glipizide. Huddled with LEANNA who said she should stop the glipizide and make an appointment for next week. Patient came to clinic to sign a release and scheduled appointment.    Next 5 appointments (look out 90 days)     Dec 19, 2017  2:20 PM CST   Office Visit with OBEY Cabello CNP   [facility]    [address], [address]  [place] [address]   [phone]                  Patsy Lyons, RN, BSN

## 2017-12-15 NOTE — TELEPHONE ENCOUNTER
Reason for Call:  Same Day Appointment, Requested Provider:  Adriana Wade DNP, FNP-BC    PCP: Adriana Wade    Reason for visit: f/u stomach     Duration of symptoms: 2 months    Have you been treated for this in the past? Yes    Additional comments: patient is wanting to be seen today    Can we leave a detailed message on this number? YES    Phone number patient can be reached at: Home number on file [phone] (home) or Cell number on file:    Telephone Information:   Mobile [phone]       Best Time: anytime    Call taken on 12/15/2017 at 9:18 AM by Bonnie Aguilar

## 2017-12-15 NOTE — PROGRESS NOTES
SUBJECTIVE:                                                    Mark Quick is a 37 year old female who presents to clinic today for the following health issues:    Answers for HPI/ROS submitted by the patient on 12/19/2017   PHQ-2 Score: 0  CARLOS EDUARDO 7 TOTAL SCORE: 0  If you checked off any problems, how difficult have these problems made it for you to do your work, take care of things at home, or get along with other people?: Not difficult at all  PHQ9 TOTAL SCORE: 2    HPI     ED/UC Followup:    Facility:  [facility] urgent [facility]   Date of visit: 12/15/17  Reason for visit: abdominal pain   Current Status: still uncomfortable but not nearly as bad as it was. They took her off Glipizide and she is feeling much better.      The patient was seen at [facility] on 12/15 for abdominal pain and diarrhea.     Prior to this visit, she describes her bowel movements as extremely watery and foamy for the last month increasing in severity and abdominal cramping closer to the time of her ED visit.     At this visit to the ED, labs were performed and yielded normal results. She was taken off of her glipizide and has started to feel much better.     GI: the patient has had semi-solid, light brown bowel movements. She has been off of her glipizide for 4 days now.     Diabetes: the patient reports her AM blood glucose levels are 120-130.     Exercise: the patient reports she hasn't been exercising with her work and school.     Appetite: the patient's appetite has improved since her last visit.     Problem list and histories reviewed & adjusted, as indicated.  Additional history: as documented    Patient Active Problem List   Diagnosis     Type 2 diabetes mellitus with hemoglobin A1c goal of less than 7.0% (H)     Hyperlipidemia LDL goal <130     Generalized anxiety disorder     Stress incontinence in female     Candidal intertrigo     Morbid obesity with BMI of 40.0-44.9, adult (H)     Fatty liver     Restless legs  syndrome (RLS)     Low ferritin     LIANA (obstructive sleep apnea)- mild (AHI 14)     Mild major depression (H)     Past Surgical History:   Procedure Laterality Date      SECTION  , , ,      TONSILLECTOMY         Social History   Substance Use Topics     Smoking status: Former Smoker     Years: 1.00     Types: Cigarettes     Start date: 1989     Quit date: 1990     Smokeless tobacco: Never Used     Alcohol use Yes      Comment: Very infrequent     Family History   Problem Relation Age of Onset     DIABETES Mother      DIABETES Maternal Grandmother      HEART DISEASE Maternal Grandfather      Asthma No family hx of      C.A.D. No family hx of      Hypertension No family hx of      CEREBROVASCULAR DISEASE No family hx of      Breast Cancer No family hx of      Cancer - colorectal No family hx of      Prostate Cancer No family hx of      Alcohol/Drug No family hx of      Allergies No family hx of      Alzheimer Disease No family hx of      Anesthesia Reaction No family hx of      Arthritis No family hx of      Blood Disease No family hx of      CANCER No family hx of      Cardiovascular No family hx of      Circulatory No family hx of      Congenital Anomalies No family hx of      Connective Tissue Disorder No family hx of      Depression No family hx of      Endocrine Disease No family hx of      Eye Disorder No family hx of      Coronary Artery Disease No family hx of      Hyperlipidemia No family hx of      Ovarian Cancer No family hx of      Depression/Anxiety No family hx of      Thyroid Disease No family hx of      OSTEOPOROSIS No family hx of      Chemical Addiction No family hx of      Known Genetic Syndrome No family hx of      MENTAL ILLNESS No family hx of      Anxiety Disorder No family hx of      Colon Cancer No family hx of      Other Cancer No family hx of      Substance Abuse No family hx of          Current Outpatient Prescriptions   Medication Sig Dispense Refill      buPROPion (WELLBUTRIN XL) 150 MG 24 hr tablet Take 1 tablet (150 mg) by mouth every morning 90 tablet 2     lisinopril (PRINIVIL/ZESTRIL) 5 MG tablet Take 1 tablet (5 mg) by mouth daily 90 tablet 3     simvastatin (ZOCOR) 40 MG tablet TAKE ONE TABLET BY MOUTH AT BEDTIME 90 tablet 2     dulaglutide (TRULICITY) 1.5 MG/0.5ML pen Inject 1.5 mg Subcutaneous every 7 days 6 mL 1     nystatin-triamcinolone (MYCOLOG II) cream Apply to affected area BID up to 7 days prn rash 30 g 3     blood glucose monitoring (NO BRAND SPECIFIED) test strip Use to test blood sugar four times daily or as directed.  One touch ultra mini system 12 Box 4     ASPIRIN NOT PRESCRIBED (INTENTIONAL) continuous prn for other Antiplatelet medication not prescribed intentionally due to Not indicated based on age       blood glucose meter (NO BRAND SPECIFIED) meter device kit Use to test blood sugar four times daily or as directed.    Get meter that is covered by insurance 1 kit 0     IBUPROFEN PO        Pediatric Multivit-Minerals-C (FLINTSTONES COMPLETE PO)        Calcium Citrate-Vitamin D (CALCIUM + D PO) Reported on 5/8/2017       Allergies   Allergen Reactions     Hydrocodone Nausea     If taking Zofran she can take it.       Percocet [Oxycodone-Acetaminophen] Nausea     If using zofran she can tolerate but prefers not to.       ROS:  Constitutional, neuro, ENT, endocrine, pulmonary, cardiac, gastrointestinal, genitourinary, musculoskeletal, integument and psychiatric systems are negative, except as otherwise noted.    This document serves as a record of the services and decisions personally performed and made by Adriana Wade DNP. It was created on her behalf by Junaid Howell, a trained medical scribe. The creation of this document is based on the provider's statements to the medical scribe.  Junaid Howell 2:47 PM December 19, 2017    OBJECTIVE:                                                    /68 (BP Location: Left arm, Patient  "Position: Chair, Cuff Size: Adult Large)  Pulse 72  Temp 98.6  F (37  C) (Oral)  Resp 16  Ht 5' 2.01\" (1.575 m)  Wt 217 lb 12.8 oz (98.8 kg)  BMI 39.83 kg/m2  Body mass index is 39.83 kg/(m^2).     GENERAL APPEARANCE: healthy, alert and no distress, obese   EYES: Eyes grossly normal to inspection, PERRL and conjunctivae and sclerae normal  HENT: ear canals and TM's normal and nose and mouth without ulcers or lesions  NECK: no adenopathy, no asymmetry, masses, or scars and thyroid normal to palpation  RESP: lungs clear to auscultation - no rales, rhonchi or wheezes  CV: regular rates and rhythm, normal S1 S2, no S3 or S4 and no murmur, click or rub  ABDOMEN: soft, nontender, without hepatosplenomegaly or masses and bowel sounds normal  NEURO: Normal strength and tone, mentation intact and speech normal  PSYCH: mentation appears normal and affect normal/bright    Diagnostic test results:  No results found for this or any previous visit (from the past 24 hour(s)).       ASSESSMENT/PLAN:                                                        ICD-10-CM    1. Type 2 diabetes mellitus with hemoglobin A1c goal of less than 7.0% (H) E11.9 Hemoglobin A1c   2. Loose stools R19.5    3. Adverse effect of drug, subsequent encounter T88.7XXD      Lifestyle: encouraged implementing healthy dietary and exercise habits to reduce her BMI and manage her diabetes.     Diabetes: will reconcile the patient's ED lab results and contact the patient about further treatment options. Conslitng with MTM regarding next medication recommendations due to SA.   Lab Results   Component Value Date    A1C 7.6 12/19/2017    A1C 8.1 09/22/2017    A1C 6.5 05/08/2017    A1C 8.6 12/21/2016    A1C 7.1 07/01/2016         Labs: Order placed for labs (A1C) that the patient will complete today. The patient will be notified with results.     Follow up with Provider - 3 months for diabetes check     The information in this document, created by the medical " scribe for me, accurately reflects the services I personally performed and the decisions made by me. I have reviewed and approved this document for accuracy prior to leaving the patient care area.  December 19, 2017 2:54 PM      OBEY Calvo [facility]

## 2017-12-19 ENCOUNTER — OFFICE VISIT (OUTPATIENT)
Dept: FAMILY MEDICINE | Facility: CLINIC | Age: 37
End: 2017-12-19
Payer: COMMERCIAL

## 2017-12-19 VITALS
SYSTOLIC BLOOD PRESSURE: 114 MMHG | TEMPERATURE: 98.6 F | BODY MASS INDEX: 40.08 KG/M2 | WEIGHT: 217.8 LBS | RESPIRATION RATE: 16 BRPM | HEIGHT: 62 IN | DIASTOLIC BLOOD PRESSURE: 68 MMHG | HEART RATE: 72 BPM

## 2017-12-19 DIAGNOSIS — R19.5 LOOSE STOOLS: ICD-10-CM

## 2017-12-19 DIAGNOSIS — E11.9 TYPE 2 DIABETES MELLITUS WITH HEMOGLOBIN A1C GOAL OF LESS THAN 7.0% (H): Primary | Chronic | ICD-10-CM

## 2017-12-19 DIAGNOSIS — T50.905D ADVERSE EFFECT OF DRUG, SUBSEQUENT ENCOUNTER: ICD-10-CM

## 2017-12-19 LAB — HBA1C MFR BLD: 7.6 % (ref 4.3–6)

## 2017-12-19 PROCEDURE — 83036 HEMOGLOBIN GLYCOSYLATED A1C: CPT | Performed by: NURSE PRACTITIONER

## 2017-12-19 PROCEDURE — 99214 OFFICE O/P EST MOD 30 MIN: CPT | Performed by: NURSE PRACTITIONER

## 2017-12-19 PROCEDURE — 36415 COLL VENOUS BLD VENIPUNCTURE: CPT | Performed by: NURSE PRACTITIONER

## 2017-12-19 ASSESSMENT — ANXIETY QUESTIONNAIRES
4. TROUBLE RELAXING: NOT AT ALL
GAD7 TOTAL SCORE: 0
2. NOT BEING ABLE TO STOP OR CONTROL WORRYING: NOT AT ALL
7. FEELING AFRAID AS IF SOMETHING AWFUL MIGHT HAPPEN: NOT AT ALL
3. WORRYING TOO MUCH ABOUT DIFFERENT THINGS: NOT AT ALL
5. BEING SO RESTLESS THAT IT IS HARD TO SIT STILL: NOT AT ALL
GAD7 TOTAL SCORE: 0
7. FEELING AFRAID AS IF SOMETHING AWFUL MIGHT HAPPEN: NOT AT ALL
1. FEELING NERVOUS, ANXIOUS, OR ON EDGE: NOT AT ALL
GAD7 TOTAL SCORE: 0
6. BECOMING EASILY ANNOYED OR IRRITABLE: NOT AT ALL

## 2017-12-19 ASSESSMENT — PATIENT HEALTH QUESTIONNAIRE - PHQ9
SUM OF ALL RESPONSES TO PHQ QUESTIONS 1-9: 2
SUM OF ALL RESPONSES TO PHQ QUESTIONS 1-9: 2
10. IF YOU CHECKED OFF ANY PROBLEMS, HOW DIFFICULT HAVE THESE PROBLEMS MADE IT FOR YOU TO DO YOUR WORK, TAKE CARE OF THINGS AT HOME, OR GET ALONG WITH OTHER PEOPLE: NOT DIFFICULT AT ALL

## 2017-12-19 NOTE — MR AVS SNAPSHOT
After Visit Summary   12/19/2017    Mark Quick    MRN: [id number]           Patient Information     Date Of Birth          1980        Visit Information        Provider Department      12/19/2017 2:20 PM Adriana Wade APRN CNP [facility]        Today's Diagnoses     Type 2 diabetes mellitus with hemoglobin A1c goal of less than 7.0% (H)    -  1    Loose stools        Adverse effect of drug, subsequent encounter           Follow-ups after your visit        Follow-up notes from your care team     Return in about 3 months (around 3/19/2018) for Diabetes Check.      Who to contact     If you have questions or need follow up information about today's clinic visit or your schedule please contact [facility] directly at [phone].  Normal or non-critical lab and imaging results will be communicated to you by [employer], letter or phone within 4 business days after the clinic has received the results. If you do not hear from us within 7 days, please contact the clinic through [employer] or phone. If you have a critical or abnormal lab result, we will notify you by phone as soon as possible.  Submit refill requests through [employer] or call your pharmacy and they will forward the refill request to us. Please allow 3 business days for your refill to be completed.          Additional Information About Your Visit        MyChart Information     [employer] gives you secure access to your electronic health record. If you see a primary care provider, you can also send messages to your care team and make appointments. If you have questions, please call your primary care clinic.  If you do not have a primary care provider, please call [phone] and they will assist you.        Care EveryWhere ID     This is your Care EveryWhere ID. This could be used by other organizations to access your [facility] medical records  [id number]        Your Vitals Were     Pulse Temperature Respirations Height  "BMI (Body Mass Index)       72 98.6  F (37  C) (Oral) 16 5' 2.01\" (1.575 m) 39.83 kg/m2        Blood Pressure from Last 3 Encounters:   12/19/17 114/68   09/22/17 116/82   08/14/17 118/78    Weight from Last 3 Encounters:   12/19/17 217 lb 12.8 oz (98.8 kg)   09/22/17 212 lb 9.6 oz (96.4 kg)   08/14/17 207 lb (93.9 kg)              We Performed the Following     Hemoglobin A1c        Primary Care Provider Office Phone # Fax #    AdrianaOBEY [facility] [phone] [address] [facility] [address]        Equal Access to Services     CB PATEL : Hadii caleb mackay hadasho Soomaali, waaxda luqadaha, qaybta kaalmada adeegyada, brian gonzalezin haybrandon burris . So [facility] [phone].    ATENCIÓN: Si habla español, tiene a chew disposición servicios gratuitos de asistencia lingüística. Llame al [phone].    We comply with applicable federal civil rights laws and [place] laws. We do not discriminate on the basis of race, color, national origin, age, disability, sex, sexual orientation, or gender identity.            Thank you!     Thank you for choosing [facility]  for your care. Our goal is always to provide you with excellent care. Hearing back from our patients is one way we can continue to improve our services. Please take a few minutes to complete the written survey that you may receive in the mail after your visit with us. Thank you!             Your Updated Medication List - Protect others around you: Learn how to safely use, store and throw away your medicines at www.disposemymeds.org.          This list is accurate as of: 12/19/17 11:59 PM.  Always use your most recent med list.                   Brand Name Dispense Instructions for use Diagnosis    ASPIRIN NOT PRESCRIBED    INTENTIONAL     continuous prn for other Antiplatelet medication not prescribed intentionally due to Not indicated based on age        blood glucose monitoring meter device kit    no brand " specified    1 kit    Use to test blood sugar four times daily or as directed.  Get meter that is covered by insurance    Type II or unspecified type diabetes mellitus without mention of complication, not stated as uncontrolled       blood glucose monitoring test strip    no brand specified    12 Box    Use to test blood sugar four times daily or as directed. One touch ultra mini system    Type 2 diabetes mellitus with complication (H)       buPROPion 150 MG 24 hr tablet    WELLBUTRIN XL    90 tablet    Take 1 tablet (150 mg) by mouth every morning    Generalized anxiety disorder, Major depressive disorder, recurrent episode, moderate (H)       CALCIUM + D PO      Reported on 5/8/2017        dulaglutide 1.5 MG/0.5ML pen    TRULICITY    6 mL    Inject 1.5 mg Subcutaneous every 7 days    Type 2 diabetes mellitus with hemoglobin A1c goal of less than 7.0% (H)       FLINTSTONES COMPLETE PO           IBUPROFEN PO           lisinopril 5 MG tablet    PRINIVIL/ZESTRIL    90 tablet    Take 1 tablet (5 mg) by mouth daily    Type 2 diabetes mellitus with hemoglobin A1c goal of less than 7.0% (H)       nystatin-triamcinolone cream    MYCOLOG II    30 g    Apply to affected area BID up to 7 days prn rash    Candidal intertrigo       simvastatin 40 MG tablet    ZOCOR    90 tablet    TAKE ONE TABLET BY MOUTH AT BEDTIME    Hyperlipidemia with target LDL less than 130, Diabetes mellitus, type 2 (H)

## 2017-12-19 NOTE — NURSING NOTE
"Chief Complaint   Patient presents with     RECHECK     Panel Management     eye exam        Initial /68 (BP Location: Left arm, Patient Position: Chair, Cuff Size: Adult Large)  Pulse 72  Temp 98.6  F (37  C) (Oral)  Resp 16  Ht 5' 2.01\" (1.575 m)  Wt 217 lb 12.8 oz (98.8 kg)  BMI 39.83 kg/m2 Estimated body mass index is 39.83 kg/(m^2) as calculated from the following:    Height as of this encounter: 5' 2.01\" (1.575 m).    Weight as of this encounter: 217 lb 12.8 oz (98.8 kg).  Medication Reconciliation: complete  "

## 2017-12-20 ENCOUNTER — TELEPHONE (OUTPATIENT)
Dept: PHARMACY | Facility: CLINIC | Age: 37
End: 2017-12-20

## 2017-12-20 ASSESSMENT — PATIENT HEALTH QUESTIONNAIRE - PHQ9: SUM OF ALL RESPONSES TO PHQ QUESTIONS 1-9: 2

## 2017-12-20 ASSESSMENT — ANXIETY QUESTIONNAIRES: GAD7 TOTAL SCORE: 0

## 2017-12-20 NOTE — TELEPHONE ENCOUNTER
No response after multiple attempts.  MTM no longer be following.  Forwarding to PCP as WILMER.    Selma Bashir, Pharm.D, BCACP  Medication Therapy Management Pharmacist

## 2017-12-21 ENCOUNTER — TELEPHONE (OUTPATIENT)
Dept: FAMILY MEDICINE | Facility: CLINIC | Age: 37
End: 2017-12-21

## 2017-12-21 DIAGNOSIS — E11.9 TYPE 2 DIABETES MELLITUS WITH HEMOGLOBIN A1C GOAL OF LESS THAN 7.0% (H): Primary | Chronic | ICD-10-CM

## 2017-12-27 NOTE — TELEPHONE ENCOUNTER
These are complimentary medications and can be used together.    Requesting RN contact to clarify situation.    Julián Mcmahon MD

## 2017-12-27 NOTE — TELEPHONE ENCOUNTER
Left message on answering machine. Requested patient call back to clarify.    Britney Estrada RN, BSN

## 2018-01-02 RX ORDER — GLYBURIDE 2.5 MG/1
2.5 TABLET ORAL
Qty: 90 TABLET | Refills: 0 | Status: SHIPPED | OUTPATIENT
Start: 2018-01-02 | End: 2018-05-04

## 2018-03-02 ENCOUNTER — TELEPHONE (OUTPATIENT)
Dept: FAMILY MEDICINE | Facility: CLINIC | Age: 38
End: 2018-03-02

## 2018-03-02 NOTE — LETTER
[facility]  [address], [address]  [place] [address]  Phone: [phone]  Fax: [phone]  March 2, 2018      Mark Quick  [address]  [place] [address]      Dear Mark,    We care about your health and have reviewed your health plan including your medical conditions, medications, and lab results.  Based on this review, it is recommended that you follow up regarding the following health topic(s):  -Depression  -Diabetes    We recommend you take the following action(s):  -schedule a FOLLOWUP OFFICE APPOINTMENT.  We will perform the following labs:  A1c.  -Complete and return the attached PHQ-9 Form.  If your total score is greater than 9, please schedule a followup appointment.  If you answer Yes to question 9, call your clinic between the hours of 8 to 5.  You may also call the Suicide Hotline at [phone]-ALEI ([phone]) any time.     Please call us at the [facility] - [phone] (or use [employer]) to address the above recommendations.     Thank you for trusting [facility] and we appreciate the opportunity to serve you.  We look forward to supporting your healthcare needs in the future.    Healthy Regards,    Your Health Care Team  [facility]

## 2018-03-02 NOTE — TELEPHONE ENCOUNTER
Summary:    Patient is due/failing the following:   A1C, FOLLOW UP and PHQ9    Action needed:   Patient needs office visit for follow up., Patient needs to do PHQ9. and Patient needs non-fasting lab only appointment    Type of outreach:    error with phone number. reminder letter sent    Questions for provider review:    None                                                                                                                                    Breana Santamaria       Chart routed to Care Team .          Panel Management Review      Patient has the following on her problem list:     Depression / Dysthymia review    Measure:  Needs PHQ-9 score of 4 or less during index window.  Administer PHQ-9 and if score is 5 or more, send encounter to provider for next steps.        PHQ-9 SCORE 8/14/2017 9/22/2017 12/19/2017   Total Score - - -   Total Score MyChart 16 (Moderately severe depression) 3 (Minimal depression) 2 (Minimal depression)   Total Score 16 3 2       If PHQ-9 recheck is 5 or more, route to provider for next steps.    Patient is due for:  PHQ9    Diabetes    ASA:     Last A1C  Lab Results   Component Value Date    A1C 7.6 12/19/2017    A1C 8.1 09/22/2017    A1C 6.5 05/08/2017    A1C 8.6 12/21/2016    A1C 7.1 07/01/2016     A1C tested: Passed    Last LDL:    Lab Results   Component Value Date    CHOL 193 05/08/2017     Lab Results   Component Value Date    HDL 52 05/08/2017     Lab Results   Component Value Date    LDL 83 05/08/2017     Lab Results   Component Value Date    TRIG 290 05/08/2017     Lab Results   Component Value Date    CHOLHDLRATIO 3.7 09/30/2015     Lab Results   Component Value Date    NHDL 141 05/08/2017       Is the patient on a Statin? YES             Is the patient on Aspirin? NO    Medications     HMG CoA Reductase Inhibitors    simvastatin (ZOCOR) 40 MG tablet          Last three blood pressure readings:  BP Readings from Last 3 Encounters:   12/19/17 114/68   09/22/17 116/82    08/14/17 118/78        Tobacco History:     History   Smoking Status     Former Smoker     Years: 1.00     Types: Cigarettes     Start date: 1/1/1989     Quit date: 1/1/1990   Smokeless Tobacco     Never Used           Composite cancer screening  Chart review shows that this patient is due/due soon for the following None

## 2018-05-03 NOTE — PROGRESS NOTES
SUBJECTIVE:   CC: Mark Quick is an 37 year old woman who presents for preventive health visit.     Physical   Annual:     Getting at least 3 servings of Calcium per day::  Yes    Bi-annual eye exam::  Yes    Dental care twice a year::  Yes    Sleep apnea or symptoms of sleep apnea::  None    Diet::  Diabetic and Other    Frequency of exercise::  1 day/week    Duration of exercise::  15-30 minutes    Taking medications regularly::  No    Barriers to taking medications::  Cost of medication and Problems remembering to take them    Medication side effects::  Other    Additional concerns today::  No            Patient repots that's he has stopped taking all her medications and is on a Keto diet for 3 months now. She is feeling much better mentally and physically, and she is not hungry all the time anymore on her new diet. She notes that she is not having a ton of protein, but if she does it is low fat protein such as chicken with occasional red meat. She states she is eating under 100 carbohydrates a day. She notes her weight is coming down and she has more energy. She notes that she is not drinking any sugary drinks, only water with occasional almond milk. She reports that she is getting enough dairy and calcium in her diet.    She has no family history of early colon cancer.    She is declining a breast and pelvic exam today. Work forms to be signed.    She states that if her lab work comes back abnormal she will go back on her medications, but otherwise she wants to stay off her medications. She acknowledges that in the winter she will consider going back on her Wellbutrin for her mood.        Today's PHQ-2 Score:   PHQ-2 ( 1999 [employer]) 5/4/2018   Q1: Little interest or pleasure in doing things 0   Q2: Feeling down, depressed or hopeless 0   PHQ-2 Score 0   Q1: Little interest or pleasure in doing things Not at all   Q2: Feeling down, depressed or hopeless Not at all   PHQ-2 Score 0   Answers for HPI/ROS submitted  by the patient on 2018   PHQ-2 Score: 0  CARLOS EDUARDO 7 TOTAL SCORE: 1    Abuse: Current or Past(Physical, Sexual or Emotional)- No  Do you feel safe in your environment - Yes    Social History   Substance Use Topics     Smoking status: Former Smoker     Years: .     Types: Cigarettes     Start date: 1989     Quit date: 1990     Smokeless tobacco: Never Used     Alcohol use Yes      Comment: Very infrequent     Alcohol Use 2018   If you drink alcohol do you typically have greater than 3 drinks per day OR greater than 7 drinks per week? Not Applicable   No flowsheet data found.    Reviewed orders with patient.  Reviewed health maintenance and updated orders accordingly - Yes  BP Readings from Last 3 Encounters:   18 122/78   17 114/68   17 116/82    Wt Readings from Last 3 Encounters:   18 212 lb 8 oz (96.4 kg)   17 217 lb 12.8 oz (98.8 kg)   17 212 lb 9.6 oz (96.4 kg)        Patient Active Problem List   Diagnosis     Type 2 diabetes mellitus with hemoglobin A1c goal of less than 7.0% (H)     Hyperlipidemia LDL goal <130     Generalized anxiety disorder     Stress incontinence in female     Candidal intertrigo     Morbid obesity with BMI of 40.0-44.9, adult (H)     Fatty liver     Restless legs syndrome (RLS)     Low ferritin     LIANA (obstructive sleep apnea)- mild (AHI 14)     Mild major depression (H)     Past Surgical History:   Procedure Laterality Date      SECTION  , , , 2012     TONSILLECTOMY         Social History   Substance Use Topics     Smoking status: Former Smoker     Years: .     Types: Cigarettes     Start date: 1989     Quit date: 1990     Smokeless tobacco: Never Used     Alcohol use Yes      Comment: Very infrequent     Family History   Problem Relation Age of Onset     DIABETES Mother      DIABETES Maternal Grandmother      HEART DISEASE Maternal Grandfather      Asthma No family hx of      C.A.D. No family hx of       Hypertension No family hx of      CEREBROVASCULAR DISEASE No family hx of      Breast Cancer No family hx of      Cancer - colorectal No family hx of      Prostate Cancer No family hx of      Alcohol/Drug No family hx of      Allergies No family hx of      Alzheimer Disease No family hx of      Anesthesia Reaction No family hx of      Arthritis No family hx of      Blood Disease No family hx of      CANCER No family hx of      Cardiovascular No family hx of      Circulatory No family hx of      Congenital Anomalies No family hx of      Connective Tissue Disorder No family hx of      Depression No family hx of      Endocrine Disease No family hx of      Eye Disorder No family hx of      Coronary Artery Disease No family hx of      Hyperlipidemia No family hx of      Ovarian Cancer No family hx of      Depression/Anxiety No family hx of      Thyroid Disease No family hx of      OSTEOPOROSIS No family hx of      Chemical Addiction No family hx of      Known Genetic Syndrome No family hx of      MENTAL ILLNESS No family hx of      Anxiety Disorder No family hx of      Colon Cancer No family hx of      Other Cancer No family hx of      Substance Abuse No family hx of          Current Outpatient Prescriptions   Medication Sig Dispense Refill     ACE/ARB/ARNI NOT PRESCRIBED, INTENTIONAL, Please choose reason not prescribed, below       ASPIRIN NOT PRESCRIBED (INTENTIONAL) continuous prn for other Antiplatelet medication not prescribed intentionally due to Not indicated based on age       blood glucose meter (NO BRAND SPECIFIED) meter device kit Use to test blood sugar four times daily or as directed.    Get meter that is covered by insurance (Patient not taking: Reported on 5/4/2018) 1 kit 0     blood glucose monitoring (NO BRAND SPECIFIED) test strip Use to test blood sugar four times daily or as directed.  One touch ultra mini system (Patient not taking: Reported on 5/4/2018) 12 Box 4     Calcium Citrate-Vitamin D  (CALCIUM + D PO) Reported on 5/8/2017       dulaglutide (TRULICITY) 1.5 MG/0.5ML pen Inject 1.5 mg Subcutaneous every 7 days (Patient not taking: Reported on 5/4/2018) 6 mL 1     IBUPROFEN PO        nystatin-triamcinolone (MYCOLOG II) cream Apply to affected area BID up to 7 days prn rash (Patient not taking: Reported on 5/4/2018) 30 g 3     Pediatric Multivit-Minerals-C (FLINTSTONES COMPLETE PO)        Allergies   Allergen Reactions     Hydrocodone Nausea     If taking Zofran she can take it.       Percocet [Oxycodone-Acetaminophen] Nausea     If using zofran she can tolerate but prefers not to.       Recent Labs   Lab Test  12/19/17   1455  09/22/17   1637  05/08/17   1012   10/14/16   1207  07/01/16   1045 05/20/16 04/27/16   0927   11/04/14   0857   A1C  7.6*  8.1*  6.5*   < >   --   7.1*   --    --    < >  8.7*   LDL   --    --   83   --    --   71   --   61   < >  Cannot estimate LDL when triglyceride exceeds 400 mg/dL   HDL   --    --   52   --    --   43*   --   46*   < >  33*   TRIG   --    --   290*   --    --   188*   --   238*   < >  735*   ALT   --    --   23   --    --   23  21   --    < >  20   CR   --    --   0.66   --    --   0.64  0.69   --    < >  0.74   GFRESTIMATED   --    --   >90  Non  GFR Calc     --    --   >90  Non  GFR Calc    >60   --    < >  89   GFRESTBLACK   --    --   >90   GFR Calc     --    --   >90   GFR Calc    >60   --    < >  >90   GFR Calc     POTASSIUM   --    --   4.0   --    --   4.0  3.9   --    < >  3.6   TSH   --    --    --    --   1.21   --    --    --    --   1.45    < > = values in this interval not displayed.      Mammogram not appropriate for this patient based on age. Patient will start next year.    Pertinent mammograms are reviewed under the imaging tab  .  History of abnormal Pap smear:   NO - age 30-65 PAP every 5 years with negative HPV co-testing recommended  Last 3 Pap and HPV  Results:   PAP / HPV 11/3/2014   PAP NIL     Reviewed and updated as needed this visit by clinical staff  Tobacco  Allergies  Meds  Med Hx  Surg Hx  Fam Hx  Soc Hx      Reviewed and updated as needed this visit by Provider        Past Medical History:   Diagnosis Date     Gestational diabetes , ,      History of blood transfusion     I was given blood and plasma after giving birth     Preeclampsia, severe     elevated LFTs      Past Surgical History:   Procedure Laterality Date      SECTION  , , ,      TONSILLECTOMY       Obstetric History       T0      L4     SAB0   TAB0   Ectopic0   Multiple0   Live Births0       # Outcome Date GA Lbr Vadim/2nd Weight Sex Delivery Anes PTL Lv   4             3             2             1                  Review of Systems  CONSTITUTIONAL: NEGATIVE for fever, chills, change in weight  INTEGUMENTARU/SKIN: NEGATIVE for worrisome rashes, moles or lesions  EYES: NEGATIVE for vision changes or irritation  ENT: NEGATIVE for ear, mouth and throat problems  RESP: NEGATIVE for significant cough or SOB  BREAST: NEGATIVE for masses, tenderness or discharge  CV: NEGATIVE for chest pain, palpitations or peripheral edema  GI: NEGATIVE for nausea, abdominal pain, heartburn, or change in bowel habits  : NEGATIVE for unusual urinary or vaginal symptoms. Periods are regular.  MUSCULOSKELETAL: NEGATIVE for significant arthralgias or myalgia  NEURO: NEGATIVE for weakness, dizziness or paresthesias  PSYCHIATRIC: NEGATIVE for changes in mood or affect     This document serves as a record of the services and decisions personally performed and made by Adriana Wade CNP. It was created on his/her behalf by Carline Nicholas, trained medical scribe. The creation of this document is based the provider's statements to the medical scribes.    Bety Nicholas 1:13 PM, May 4, 2018  OBJECTIVE:   /78  Pulse 100   Temp 98.5  F (36.9  C) (Temporal)  Resp 18  Ht 5' (1.524 m)  Wt 212 lb 8 oz (96.4 kg)  LMP 04/09/2018 (Approximate)  SpO2 97%  Breastfeeding? No  BMI 41.5 kg/m2     Physical Exam  GENERAL: healthy, alert and no distress  EYES: Eyes grossly normal to inspection, PERRL and conjunctivae and sclerae normal  HENT: ear canals and TM's normal, nose and mouth without ulcers or lesions  NECK: no adenopathy, no asymmetry, masses, or scars and thyroid normal to palpation  RESP: lungs clear to auscultation - no rales, rhonchi or wheezes  CV: regular rate and rhythm, normal S1 S2, no S3 or S4, no murmur, click or rub, no peripheral edema and peripheral pulses strong  ABDOMEN: soft, nontender, no hepatosplenomegaly, no masses and bowel sounds normal  MS: no gross musculoskeletal defects noted, no edema  SKIN: no suspicious lesions or rashes  NEURO: Normal strength and tone, mentation intact and speech normal  PSYCH: mentation appears normal, affect normal/bright  Diabetic foot exam: normal DP and PT pulses, no trophic changes or ulcerative lesions and normal sensory exam    ASSESSMENT/PLAN:       ICD-10-CM    1. Encounter for routine adult medical exam with abnormal findings Z00.01 Lipid panel reflex to direct LDL Fasting     TSH with free T4 reflex     **HIV Antigen Antibody Combo FUTURE 2mo   2. Screening breast examination Z12.31 MA Screening Digital Bilateral   3. Type 2 diabetes mellitus with hemoglobin A1c goal of less than 7.0% (H) E11.9 Comprehensive metabolic panel     Albumin Random Urine Quantitative with Creat Ratio     **A1C FUTURE 3mo     ACE/ARB/ARNI NOT PRESCRIBED, INTENTIONAL,     Physical exam completed today with no breast or pelvis exam per patient's request. TSH with T4 reflex, CMP, and albumin random urine quantitative with creat ratio blood labs completed today; will call with results.    Future HIV antigen antibody combo, A1c, and fasting lipid blood labs placed today. Patient will come back when  fasting for labs.    Mammogram due in 2019. Patient will schedule; referral given today.      Follow up in clinic when fasting for blood labs. PCP will follow up and call with lab results when resulted.    COUNSELING:  Healthy diet and exercise  Amount of protein intake in the new Keto diet she is on  Medication management for diabetes  Mood management and status  Family life  DM cares, protein balance and kidney stress.   Labs pending, pt. Declines maintenance meds for DM such as statin, ACE at this time.       BP Screening:   Last 3 BP Readings:    BP Readings from Last 3 Encounters:   05/04/18 122/78   12/19/17 114/68   09/22/17 116/82     The following was recommended to the patient:  Re-screen BP within a year and recommended lifestyle modifications     reports that she quit smoking about 28 years ago. Her smoking use included Cigarettes. She started smoking about 29 years ago. She quit after 1.00 year of use. She has never used smokeless tobacco.    Estimated body mass index is 41.5 kg/(m^2) as calculated from the following:    Height as of this encounter: 5' (1.524 m).    Weight as of this encounter: 212 lb 8 oz (96.4 kg).   Weight management plan: Discussed healthy diet and exercise guidelines and patient will follow up in 12 months in clinic to re-evaluate.     Discussed adequate carb intake, not no carb.   Pt. declined nutrition referral.     Re-check 3-6 months for DM    Counseling Resources:  ATP IV Guidelines  Pooled Cohorts Equation Calculator  Breast Cancer Risk Calculator  FRAX Risk Assessment  ICSI Preventive Guidelines  Dietary Guidelines for Americans, 2010  USDA's MyPlate  ASA Prophylaxis  Lung CA Screening    The information in this document, created by the medical scribe for me, accurately reflects the services I personally performed and the decisions made by me. I have reviewed and approved this document for accuracy prior to leaving the patient care area.  Adriana Wade CNP  1:13 PM,  05/04/18    [facility]

## 2018-05-04 ENCOUNTER — OFFICE VISIT (OUTPATIENT)
Dept: FAMILY MEDICINE | Facility: CLINIC | Age: 38
End: 2018-05-04
Payer: COMMERCIAL

## 2018-05-04 VITALS
BODY MASS INDEX: 41.72 KG/M2 | OXYGEN SATURATION: 97 % | HEART RATE: 100 BPM | SYSTOLIC BLOOD PRESSURE: 122 MMHG | RESPIRATION RATE: 18 BRPM | DIASTOLIC BLOOD PRESSURE: 78 MMHG | TEMPERATURE: 98.5 F | HEIGHT: 60 IN | WEIGHT: 212.5 LBS

## 2018-05-04 DIAGNOSIS — Z12.39 SCREENING BREAST EXAMINATION: ICD-10-CM

## 2018-05-04 DIAGNOSIS — E11.9 TYPE 2 DIABETES MELLITUS WITH HEMOGLOBIN A1C GOAL OF LESS THAN 7.0% (H): Chronic | ICD-10-CM

## 2018-05-04 DIAGNOSIS — Z00.01 ENCOUNTER FOR ROUTINE ADULT MEDICAL EXAM WITH ABNORMAL FINDINGS: Primary | ICD-10-CM

## 2018-05-04 PROCEDURE — 99395 PREV VISIT EST AGE 18-39: CPT | Performed by: NURSE PRACTITIONER

## 2018-05-04 ASSESSMENT — ANXIETY QUESTIONNAIRES
GAD7 TOTAL SCORE: 1
GAD7 TOTAL SCORE: 1
7. FEELING AFRAID AS IF SOMETHING AWFUL MIGHT HAPPEN: NOT AT ALL
5. BEING SO RESTLESS THAT IT IS HARD TO SIT STILL: NOT AT ALL
7. FEELING AFRAID AS IF SOMETHING AWFUL MIGHT HAPPEN: NOT AT ALL
4. TROUBLE RELAXING: NOT AT ALL
2. NOT BEING ABLE TO STOP OR CONTROL WORRYING: NOT AT ALL
6. BECOMING EASILY ANNOYED OR IRRITABLE: SEVERAL DAYS
1. FEELING NERVOUS, ANXIOUS, OR ON EDGE: NOT AT ALL
GAD7 TOTAL SCORE: 1
3. WORRYING TOO MUCH ABOUT DIFFERENT THINGS: NOT AT ALL

## 2018-05-04 ASSESSMENT — PAIN SCALES - GENERAL: PAINLEVEL: NO PAIN (0)

## 2018-05-04 ASSESSMENT — PATIENT HEALTH QUESTIONNAIRE - PHQ9
SUM OF ALL RESPONSES TO PHQ QUESTIONS 1-9: 0
10. IF YOU CHECKED OFF ANY PROBLEMS, HOW DIFFICULT HAVE THESE PROBLEMS MADE IT FOR YOU TO DO YOUR WORK, TAKE CARE OF THINGS AT HOME, OR GET ALONG WITH OTHER PEOPLE: NOT DIFFICULT AT ALL
SUM OF ALL RESPONSES TO PHQ QUESTIONS 1-9: 0

## 2018-05-04 NOTE — MR AVS SNAPSHOT
After Visit Summary   5/4/2018    Mark Quick    MRN: [id number]           Patient Information     Date Of Birth          1980        Visit Information        Provider Department      5/4/2018 1:00 PM Adriana Wade APRN [facility]        Today's Diagnoses     Encounter for routine adult medical exam with abnormal findings    -  1    Screening breast examination        Type 2 diabetes mellitus with hemoglobin A1c goal of less than 7.0% (H)          Care Instructions      Preventive Health Recommendations  Female Ages 26 - 39  Yearly exam:   See your health care provider every year in order to    Review health changes.     Discuss preventive care.      Review your medicines if you your doctor has prescribed any.    Until age 30: Get a Pap test every three years (more often if you have had an abnormal result).    After age 30: Talk to your doctor about whether you should have a Pap test every 3 years or have a Pap test with HPV screening every 5 years.   You do not need a Pap test if your uterus was removed (hysterectomy) and you have not had cancer.  You should be tested each year for STDs (sexually transmitted diseases), if you're at risk.   Talk to your provider about how often to have your cholesterol checked.  If you are at risk for diabetes, you should have a diabetes test (fasting glucose).  Shots: Get a flu shot each year. Get a tetanus shot every 10 years.   Nutrition:     Eat at least 5 servings of fruits and vegetables each day.    Eat whole-grain bread, whole-wheat pasta and brown rice instead of white grains and rice.    Talk to your provider about Calcium and Vitamin D.     Lifestyle    Exercise at least 150 minutes a week (30 minutes a day, 5 days of the week). This will help you control your weight and prevent disease.    Limit alcohol to one drink per day.    No smoking.     Wear sunscreen to prevent skin cancer.    See your dentist every six months for an exam and  cleaning.            Follow-ups after your visit        Your next 10 appointments already scheduled     May 07, 2018 11:00 AM CDT   LAB with [facility] LAB   [facility] ([facility])    [address], [address]  [facility] [id number] [phone]           Please do not eat 10-12 hours before your appointment if you are coming in fasting for labs on lipids, cholesterol, or glucose (sugar). This does not apply to pregnant women. Water, hot tea and black coffee (with nothing added) are okay. Do not drink other fluids, diet soda or chew gum.              Future tests that were ordered for you today     Open Future Orders        Priority Expected Expires Ordered    Lipid panel reflex to direct LDL Fasting Routine  7/3/2018 5/4/2018    TSH with free T4 reflex Routine  7/3/2018 5/4/2018    Comprehensive metabolic panel Routine  8/2/2018 5/4/2018    Albumin Random Urine Quantitative with Creat Ratio Routine  5/4/2019 5/4/2018    **HIV Antigen Antibody Combo FUTURE 2mo Routine 7/3/2018 9/1/2018 5/4/2018    MA Screening Digital Bilateral Routine  5/4/2019 5/4/2018    **A1C FUTURE 3mo Routine 8/2/2018 9/1/2018 5/4/2018            Who to contact     If you have questions or need follow up information about today's clinic visit or your schedule please contact [facility] [place] directly at [phone].  Normal or non-critical lab and imaging results will be communicated to you by MyChart, letter or phone within 4 business days after the clinic has received the results. If you do not hear from us within 7 days, please contact the clinic through [employer] or phone. If you have a critical or abnormal lab result, we will notify you by phone as soon as possible.  Submit refill requests through [employer] or call your pharmacy and they will forward the refill request to us. Please allow 3 business days for your refill to be completed.          Additional Information About Your Visit        [employer]  Information     [employer] gives you secure access to your electronic health record. If you see a primary care provider, you can also send messages to your care team and make appointments. If you have questions, please call your primary care clinic.  If you do not have a primary care provider, please call [phone] and they will assist you.        Care EveryWhere ID     This is your Care EveryWhere ID. This could be used by other organizations to access your [facility] medical records  [id number]        Your Vitals Were     Pulse Temperature Respirations Height Last Period Pulse Oximetry    100 98.5  F (36.9  C) (Temporal) 18 5' (1.524 m) 04/09/2018 (Approximate) 97%    Breastfeeding? BMI (Body Mass Index)                No 41.5 kg/m2           Blood Pressure from Last 3 Encounters:   05/04/18 122/78   12/19/17 114/68   09/22/17 116/82    Weight from Last 3 Encounters:   05/04/18 212 lb 8 oz (96.4 kg)   12/19/17 217 lb 12.8 oz (98.8 kg)   09/22/17 212 lb 9.6 oz (96.4 kg)                 Today's Medication Changes          These changes are accurate as of 5/4/18  4:53 PM.  If you have any questions, ask your nurse or doctor.               Start taking these medicines.        Dose/Directions    ACE/ARB/ARNI NOT PRESCRIBED (INTENTIONAL)   Used for:  Type 2 diabetes mellitus with hemoglobin A1c goal of less than 7.0% (H)   Started by:  Adriana Wade APRN CNP        Please choose reason not prescribed, below   Refills:  0            Where to get your medicines      Some of these will need a paper prescription and others can be bought over the counter.  Ask your nurse if you have questions.     You don't need a prescription for these medications     ACE/ARB/ARNI NOT PRESCRIBED (INTENTIONAL)                Primary Care Provider Office Phone # Fax #    OBEY Cabello [phone] [address] [place] [address]        Equal Access to Services     CB PATEL AH: Velvet rod  Lester, luisda lupranavadaha, carlee kavinny bonilla, brian lisain hayaan raudelsondra bonillamarielos laMaebrandon natalie. So [facility] [phone].    ATENCIÓN: Si swapna franco, tiene a chew disposición servicios gratuitos de asistencia lingüística. Lisa al [phone].    We comply with applicable federal civil rights laws and [place] laws. We do not discriminate on the basis of race, color, national origin, age, disability, sex, sexual orientation, or gender identity.            Thank you!     Thank you for choosing [facility]  for your care. Our goal is always to provide you with excellent care. Hearing back from our patients is one way we can continue to improve our services. Please take a few minutes to complete the written survey that you may receive in the mail after your visit with us. Thank you!             Your Updated Medication List - Protect others around you: Learn how to safely use, store and throw away your medicines at www.disposemymeds.org.          This list is accurate as of 5/4/18  4:53 PM.  Always use your most recent med list.                   Brand Name Dispense Instructions for use Diagnosis    ACE/ARB/ARNI NOT PRESCRIBED (INTENTIONAL)      Please choose reason not prescribed, below    Type 2 diabetes mellitus with hemoglobin A1c goal of less than 7.0% (H)       ASPIRIN NOT PRESCRIBED    INTENTIONAL     continuous prn for other Antiplatelet medication not prescribed intentionally due to Not indicated based on age        blood glucose monitoring meter device kit    no brand specified    1 kit    Use to test blood sugar four times daily or as directed.  Get meter that is covered by insurance    Type II or unspecified type diabetes mellitus without mention of complication, not stated as uncontrolled       blood glucose monitoring test strip    no brand specified    12 Box    Use to test blood sugar four times daily or as directed. One touch ultra mini system    Type 2 diabetes mellitus with complication  (H)       CALCIUM + D PO      Reported on 5/8/2017        dulaglutide 1.5 MG/0.5ML pen    TRULICITY    6 mL    Inject 1.5 mg Subcutaneous every 7 days    Type 2 diabetes mellitus with hemoglobin A1c goal of less than 7.0% (H)       FLINTSTONES COMPLETE PO           IBUPROFEN PO           nystatin-triamcinolone cream    MYCOLOG II    30 g    Apply to affected area BID up to 7 days prn rash    Candidal intertrigo

## 2018-05-05 ASSESSMENT — ANXIETY QUESTIONNAIRES: GAD7 TOTAL SCORE: 1

## 2018-05-05 ASSESSMENT — PATIENT HEALTH QUESTIONNAIRE - PHQ9: SUM OF ALL RESPONSES TO PHQ QUESTIONS 1-9: 0

## 2018-05-07 DIAGNOSIS — Z00.01 ENCOUNTER FOR ROUTINE ADULT MEDICAL EXAM WITH ABNORMAL FINDINGS: ICD-10-CM

## 2018-05-07 DIAGNOSIS — E11.9 TYPE 2 DIABETES MELLITUS WITH HEMOGLOBIN A1C GOAL OF LESS THAN 7.0% (H): Chronic | ICD-10-CM

## 2018-05-07 LAB
ALBUMIN SERPL-MCNC: 3.6 G/DL (ref 3.4–5)
ALP SERPL-CCNC: 65 U/L (ref 40–150)
ALT SERPL W P-5'-P-CCNC: 18 U/L (ref 0–50)
ANION GAP SERPL CALCULATED.3IONS-SCNC: 8 MMOL/L (ref 3–14)
AST SERPL W P-5'-P-CCNC: 14 U/L (ref 0–45)
BILIRUB SERPL-MCNC: 0.8 MG/DL (ref 0.2–1.3)
BUN SERPL-MCNC: 15 MG/DL (ref 7–30)
CALCIUM SERPL-MCNC: 8.9 MG/DL (ref 8.5–10.1)
CHLORIDE SERPL-SCNC: 100 MMOL/L (ref 94–109)
CHOLEST SERPL-MCNC: 212 MG/DL
CO2 SERPL-SCNC: 27 MMOL/L (ref 20–32)
CREAT SERPL-MCNC: 0.61 MG/DL (ref 0.52–1.04)
CREAT UR-MCNC: 45 MG/DL
GFR SERPL CREATININE-BSD FRML MDRD: >90 ML/MIN/1.7M2
GLUCOSE SERPL-MCNC: 267 MG/DL (ref 70–99)
HBA1C MFR BLD: 8 % (ref 0–5.6)
HDLC SERPL-MCNC: 36 MG/DL
LDLC SERPL CALC-MCNC: 108 MG/DL
MICROALBUMIN UR-MCNC: <5 MG/L
MICROALBUMIN/CREAT UR: NORMAL MG/G CR (ref 0–25)
NONHDLC SERPL-MCNC: 176 MG/DL
POTASSIUM SERPL-SCNC: 3.8 MMOL/L (ref 3.4–5.3)
PROT SERPL-MCNC: 7.7 G/DL (ref 6.8–8.8)
SODIUM SERPL-SCNC: 135 MMOL/L (ref 133–144)
TRIGL SERPL-MCNC: 338 MG/DL
TSH SERPL DL<=0.005 MIU/L-ACNC: 1.33 MU/L (ref 0.4–4)

## 2018-05-07 PROCEDURE — 84443 ASSAY THYROID STIM HORMONE: CPT | Performed by: NURSE PRACTITIONER

## 2018-05-07 PROCEDURE — 82043 UR ALBUMIN QUANTITATIVE: CPT | Performed by: NURSE PRACTITIONER

## 2018-05-07 PROCEDURE — 83036 HEMOGLOBIN GLYCOSYLATED A1C: CPT | Performed by: NURSE PRACTITIONER

## 2018-05-07 PROCEDURE — 36415 COLL VENOUS BLD VENIPUNCTURE: CPT | Performed by: NURSE PRACTITIONER

## 2018-05-07 PROCEDURE — 80053 COMPREHEN METABOLIC PANEL: CPT | Performed by: NURSE PRACTITIONER

## 2018-05-07 PROCEDURE — 87389 HIV-1 AG W/HIV-1&-2 AB AG IA: CPT | Performed by: NURSE PRACTITIONER

## 2018-05-07 PROCEDURE — 80061 LIPID PANEL: CPT | Performed by: NURSE PRACTITIONER

## 2018-05-08 LAB — HIV 1+2 AB+HIV1 P24 AG SERPL QL IA: NONREACTIVE

## 2018-06-26 DIAGNOSIS — E11.9 TYPE 2 DIABETES MELLITUS WITH HEMOGLOBIN A1C GOAL OF LESS THAN 7.0% (H): Chronic | ICD-10-CM

## 2018-06-27 RX ORDER — DULAGLUTIDE 1.5 MG/.5ML
INJECTION, SOLUTION SUBCUTANEOUS
Qty: 6 ML | Refills: 1 | Status: SHIPPED | OUTPATIENT
Start: 2018-06-27 | End: 2018-09-11

## 2018-06-27 NOTE — TELEPHONE ENCOUNTER
"Requested Prescriptions   Pending Prescriptions Disp Refills     TRULICITY 1.5 MG/0.5ML pen [Pharmacy Med Name: TRULICITY 1.5 MG/0.5 ML PEN]  0     Sig: INJECT 1.5 MG SUBCUTANEOUS EVERY 7 DAYS    GLP-1 Agonists Protocol Passed    6/26/2018 11:58 AM       Passed - Blood pressure less than 140/90 in past 6 months    BP Readings from Last 3 Encounters:   05/04/18 122/78   12/19/17 114/68   09/22/17 116/82          Passed - LDL on file in past 12 months    Recent Labs   Lab Test  05/07/18   1058   LDL  108*          Passed - Microalbumin on file in past 12 months    Recent Labs   Lab Test  05/07/18   1058   MICROL  <5   UMALCR  Unable to calculate due to low value          Passed - HgbA1C in past 3 or 6 months    If HgbA1C is 8 or greater, it needs to be on file within the past 3 months.  If less than 8, must be on file within the past 6 months.     Recent Labs   Lab Test  05/07/18   1058   A1C  8.0*          Passed - Patient is age 18 or older       Passed - No active pregnancy on record       Passed - Normal serum creatinine on file in past 12 months    Recent Labs   Lab Test  05/07/18   1058   CR  0.61          Passed - No positive pregnancy test in past 12 months       Passed - Recent (6 mo) or future (30 days) visit within the authorizing provider's specialty    Patient had office visit in the last 6 months or has a visit in the next 30 days with authorizing provider.  See \"Patient Info\" tab in inbasket, or \"Choose Columns\" in Meds & Orders section of the refill encounter.            dulaglutide (TRULICITY) 1.5 MG/0.5ML pen  Prescription approved per [facility] Refill Protocol.    Per LOV  05/04/2018 to follow up for Diabetic follow up in 3-6 months. Please assist with scheduling per patients preference between 08/2018 to 11/2018.    Christina Beckford, RN, BSN       "

## 2018-08-09 ENCOUNTER — TELEPHONE (OUTPATIENT)
Dept: FAMILY MEDICINE | Facility: CLINIC | Age: 38
End: 2018-08-09

## 2018-08-09 NOTE — TELEPHONE ENCOUNTER
Summary:    Patient is due/failing the following:   Diabetic follow up and A1C    Action needed:   Patient needs office visit for follow up.    Type of outreach:    Sent [employer] message.    Questions for provider review:    None                                                                                                                                    Breana Santamaria       Chart routed to Care Team .          Panel Management Review      Patient has the following on her problem list:     Depression / Dysthymia review    Measure:  Needs PHQ-9 score of 4 or less during index window.  Administer PHQ-9 and if score is 5 or more, send encounter to provider for next steps.        PHQ-9 SCORE 9/22/2017 12/19/2017 5/4/2018   Total Score - - -   Total Score MyChart 3 (Minimal depression) 2 (Minimal depression) 0   Total Score 3 2 0       If PHQ-9 recheck is 5 or more, route to provider for next steps.    Patient is due for:  None    Diabetes    ASA: Passed    Last A1C  Lab Results   Component Value Date    A1C 8.0 05/07/2018    A1C 7.6 12/19/2017    A1C 8.1 09/22/2017    A1C 6.5 05/08/2017    A1C 8.6 12/21/2016     A1C tested: FAILED    Last LDL:    Lab Results   Component Value Date    CHOL 212 05/07/2018     Lab Results   Component Value Date    HDL 36 05/07/2018     Lab Results   Component Value Date     05/07/2018     Lab Results   Component Value Date    TRIG 338 05/07/2018     Lab Results   Component Value Date    CHOLHDLRATIO 3.7 09/30/2015     Lab Results   Component Value Date    NHDL 176 05/07/2018       Is the patient on a Statin? NO             Is the patient on Aspirin? NO        Last three blood pressure readings:  BP Readings from Last 3 Encounters:   05/04/18 122/78   12/19/17 114/68   09/22/17 116/82          Tobacco History:     History   Smoking Status     Former Smoker     Years: 1.00     Types: Cigarettes     Start date: 1/1/1989     Quit date: 1/1/1990   Smokeless Tobacco     Never Used            Composite cancer screening  Chart review shows that this patient is due/due soon for the following None

## 2018-09-10 NOTE — PROGRESS NOTES
"   SUBJECTIVE:   CC: Mark Quick is an 37 year old woman who presents for preventive health visit.     Physical   Annual:     Getting at least 3 servings of Calcium per day:  NO    Bi-annual eye exam:  Yes    Dental care twice a year:  Yes    Sleep apnea or symptoms of sleep apnea:  None    Diet:  Diabetic    Frequency of exercise:  1 day/week    Duration of exercise:  15-30 minutes    Taking medications regularly:  Yes    Medication side effects:  None    Additional concerns today:  No    Diabetes and weight:  Patient has not been checking blood sugars at home due to running out of equipment. She is wanting to switch monitors if possible. In terms of weight and exercise, she has been trying to increase her physical activity, though her and the house hold has busy schedules with sports. 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    Ob/gyn status:  Patient is still having periods monthly, but reports that her periods have been heavier. She explains that if she pushes on her abdomen or bend over, \"it will gush out\". She notes of small and big formation menstrual clots, big as the size of the tip of her pinky. She has a family history of early menopause.        Today's PHQ-2 Score:   PHQ-2 ( 1999 Pf[employer]) 9/11/2018   Q1: Little interest or pleasure in doing things 0   Q2: Feeling down, depressed or hopeless 0   PHQ-2 Score 0   Q1: Little interest or pleasure in doing things Not at all   Q2: Feeling down, depressed or hopeless Not at all   PHQ-2 Score 0     Abuse: Current or Past(Physical, Sexual or Emotional)- No  Do you feel safe in your environment - Yes    Social History   Substance Use Topics     Smoking status: Former Smoker     Years: 1.00     Types: Cigarettes     Start date: 1/1/1989     Quit date: 1/1/1990     Smokeless tobacco: Never Used     Alcohol use Yes      Comment: Very infrequent     Alcohol Use 9/11/2018   If you drink alcohol do you typically have greater than 3 drinks per day " OR greater than 7 drinks per week? Not Applicable     Reviewed orders with patient.  Reviewed health maintenance and updated orders accordingly - Yes  BP Readings from Last 3 Encounters:   18 118/84   18 122/78   17 114/68    Wt Readings from Last 3 Encounters:   18 211 lb 12.8 oz (96.1 kg)   18 212 lb 8 oz (96.4 kg)   17 217 lb 12.8 oz (98.8 kg)         Patient Active Problem List   Diagnosis     Type 2 diabetes mellitus with hemoglobin A1c goal of less than 7.0% (H)     Hyperlipidemia LDL goal <130     Generalized anxiety disorder     Stress incontinence in female     Candidal intertrigo     Morbid obesity with BMI of 40.0-44.9, adult (H)     Fatty liver     Restless legs syndrome (RLS)     Low ferritin     LIANA (obstructive sleep apnea)- mild (AHI 14)     Mild major depression (H)     Past Surgical History:   Procedure Laterality Date      SECTION  , , ,      TONSILLECTOMY       TUBAL LIGATION         Social History   Substance Use Topics     Smoking status: Former Smoker     Years: 1.00     Types: Cigarettes     Start date: 1989     Quit date: 1990     Smokeless tobacco: Never Used     Alcohol use Yes      Comment: Very infrequent     Family History   Problem Relation Age of Onset     Diabetes Mother      Diabetes Maternal Grandmother      HEART DISEASE Maternal Grandfather      Asthma No family hx of      C.A.D. No family hx of      Hypertension No family hx of      Cerebrovascular Disease No family hx of      Breast Cancer No family hx of      Cancer - colorectal No family hx of      Prostate Cancer No family hx of      Alcohol/Drug No family hx of      Allergies No family hx of      Alzheimer Disease No family hx of      Anesthesia Reaction No family hx of      Arthritis No family hx of      Blood Disease No family hx of      Cancer No family hx of      Cardiovascular No family hx of      Circulatory No family hx of      Congenital  Anomalies No family hx of      Connective Tissue Disorder No family hx of      Depression No family hx of      Endocrine Disease No family hx of      Eye Disorder No family hx of      Coronary Artery Disease No family hx of      Hyperlipidemia No family hx of      Ovarian Cancer No family hx of      Depression/Anxiety No family hx of      Thyroid Disease No family hx of      Osteoporosis No family hx of      Chemical Addiction No family hx of      Known Genetic Syndrome No family hx of      Mental Illness No family hx of      Anxiety Disorder No family hx of      Colon Cancer No family hx of      Other Cancer No family hx of      Substance Abuse No family hx of          Current Outpatient Prescriptions   Medication Sig Dispense Refill     aspirin 81 MG EC tablet Take 1 tablet (81 mg) by mouth daily 90 tablet 3     dulaglutide (TRULICITY) 1.5 MG/0.5ML pen Inject 1.5 mg Subcutaneous every 7 days 2 mL 6     glyBURIDE (DIABETA /MICRONASE) 5 MG tablet Take 1 tablet (5 mg) by mouth 2 times daily (with meals) 180 tablet 0     lisinopril (PRINIVIL/ZESTRIL) 5 MG tablet TAKE 1 TABLET (5 MG) BY MOUTH DAILY  3     simvastatin (ZOCOR) 40 MG tablet Take 40 mg by mouth  1     ASPIRIN NOT PRESCRIBED (INTENTIONAL) continuous prn for other Antiplatelet medication not prescribed intentionally due to Not indicated based on age       blood glucose meter (NO BRAND SPECIFIED) meter device kit Use to test blood sugar four times daily or as directed.    Get meter that is covered by insurance (Patient not taking: Reported on 5/4/2018) 1 kit 0     blood glucose monitoring (NO BRAND SPECIFIED) test strip Use to test blood sugar four times daily or as directed.  One touch ultra mini system (Patient not taking: Reported on 5/4/2018) 12 Box 4     Calcium Citrate-Vitamin D (CALCIUM + D PO) Reported on 5/8/2017       IBUPROFEN PO        nystatin-triamcinolone (MYCOLOG II) cream Apply to affected area BID up to 7 days prn rash (Patient not taking:  Reported on 5/4/2018) 30 g 3     Pediatric Multivit-Minerals-C (FLINTSTONES COMPLETE PO)        Allergies   Allergen Reactions     Hydrocodone Nausea     If taking Zofran she can take it.       Percocet [Oxycodone-Acetaminophen] Nausea     If using zofran she can tolerate but prefers not to.       Recent Labs   Lab Test  09/11/18   0913  05/07/18   1058  12/19/17   1455   05/08/17   1012   10/14/16   1207  07/01/16   1045   A1C  8.5*  8.0*  7.6*   < >  6.5*   < >   --   7.1*   LDL   --   108*   --    --   83   --    --   71   HDL   --   36*   --    --   52   --    --   43*   TRIG   --   338*   --    --   290*   --    --   188*   ALT   --   18   --    --   23   --    --   23   CR  0.63  0.61   --    --   0.66   --    --   0.64   GFRESTIMATED  >90  >90   --    --   >90  Non  GFR Calc     --    --   >90  Non  GFR Calc     GFRESTBLACK  >90  >90   --    --   >90   GFR Calc     --    --   >90   GFR Calc     POTASSIUM  4.0  3.8   --    --   4.0   --    --   4.0   TSH   --   1.33   --    --    --    --   1.21   --     < > = values in this interval not displayed.      Mammogram not appropriate for this patient based on age.    Pertinent mammograms are reviewed under the imaging tab.  History of abnormal Pap smear:   NO - age 30-65 PAP every 5 years with negative HPV co-testing recommended  Last 3 Pap Results:   PAP (no units)   Date Value   11/03/2014 NIL     Last 3 Pap and HPV Results:   PAP / HPV 11/3/2014   PAP NIL     PAP / HPV 11/3/2014   PAP NIL     Reviewed and updated as needed this visit by clinical staff  Tobacco  Allergies  Meds  Med Hx  Surg Hx  Fam Hx  Soc Hx        Reviewed and updated as needed this visit by Provider        Review of Systems  CONSTITUTIONAL: NEGATIVE for fever, chills, change in weight  INTEGUMENTARU/SKIN: NEGATIVE for worrisome rashes, moles or lesions  EYES: NEGATIVE for vision changes or irritation  ENT: NEGATIVE for ear,  "mouth and throat problems  RESP: NEGATIVE for significant cough or SOB  BREAST: NEGATIVE for masses, tenderness or discharge  CV: NEGATIVE for chest pain, palpitations or peripheral edema  GI: NEGATIVE for nausea, abdominal pain, heartburn, or change in bowel habits  : NEGATIVE for unusual urinary or vaginal symptoms. Menses: menorrhagia   MUSCULOSKELETAL: NEGATIVE for significant arthralgias or myalgia  NEURO: NEGATIVE for weakness, dizziness or paresthesias  PSYCHIATRIC: NEGATIVE for changes in mood or affect     This document serves as a record of the services and decisions personally performed and made by Adriana Wade CNP. It was created on her behalf by Brianna Best, a trained medical scribe. The creation of this document is based the provider's statements to the medical scribe.    Brianna Best September 11, 2018 8:44 AM    OBJECTIVE:   /84  Pulse 87  Temp 97.8  F (36.6  C) (Temporal)  Resp 14  Ht 4' 11.5\" (1.511 m)  Wt 211 lb 12.8 oz (96.1 kg)  LMP 08/31/2018 (Exact Date)  SpO2 99%  BMI 42.06 kg/m2  Physical Exam  GENERAL: healthy, alert and no distress  EYES: Eyes grossly normal to inspection, PERRL and conjunctivae and sclerae normal  HENT: ear canals and TM's normal, nose and mouth without ulcers or lesions  NECK: no adenopathy, no asymmetry, masses, or scars and thyroid normal to palpation  RESP: lungs clear to auscultation - no rales, rhonchi or wheezes  BREAST: normal without masses, tenderness or nipple discharge and no palpable axillary masses or adenopathy  CV: regular rate and rhythm, normal S1 S2, no S3 or S4, no murmur, click or rub, no peripheral edema and peripheral pulses strong  ABDOMEN: soft, nontender, no hepatosplenomegaly, no masses and bowel sounds normal   (female): normal female external genitalia, normal urethral meatus, vaginal mucosa pink, moist, well rugated, and normal cervix/adnexa/uterus without masses or discharge  MS: no gross musculoskeletal defects noted, no " "edema  SKIN: no suspicious lesions or rashes  NEURO: Normal strength and tone, mentation intact and speech normal  PSYCH: mentation appears normal, affect normal/bright  Diabetic foot exam: normal DP and PT pulses, no trophic changes or ulcerative lesions and normal sensory exam    Diagnostic Test Results:  No results found for this or any previous visit (from the past 24 hour(s)).    ASSESSMENT/PLAN:       ICD-10-CM    1. Encounter for routine adult health examination without abnormal findings Z00.00    2. Type 2 diabetes mellitus with hemoglobin A1c goal of less than 7.0% (H) E11.9 dulaglutide (TRULICITY) 1.5 MG/0.5ML pen     Hemoglobin A1c     Basic metabolic panel     aspirin 81 MG EC tablet     glyBURIDE (DIABETA /MICRONASE) 5 MG tablet     DIABETES EDUCATOR REFERRAL     OFFICE/OUTPT VISIT,[facility]   3. Encounter for screening mammogram for breast cancer Z12.31 MA Screening Digital Bilateral   4. Morbid obesity with BMI of 40.0-44.9, adult (H) E66.01 DIABETES EDUCATOR REFERRAL    Z68.41    Physical and routine labs updated today.   Forms completed today.  Discussed diabetes. Pt's diabetes is uncontrollable. Recommended diabetes educator for MTM. Updating diabetes labs today. Will add in Aspirin rx and increase Glyburide medication. See orders.  Discussed healthy habits and exercise.   Discussed menses and menopause. Reviewed etiologies for menorrhagia and if her symptoms persists, will do ob/gyn referral- short term eval. 1-2 cycles recommended.     DM uncontrolled, sent to MTM and diabetic education.     COUNSELING:  Special attention given to:        Regular exercise       Healthy diet/nutrition    BP Readings from Last 1 Encounters:   09/11/18 118/84     Estimated body mass index is 42.06 kg/(m^2) as calculated from the following:    Height as of this encounter: 4' 11.5\" (1.511 m).    Weight as of this encounter: 211 lb 12.8 oz (96.1 kg).      Weight management plan: Discussed healthy diet and exercise " guidelines and patient will follow up in 12 months in clinic to re-evaluate.     reports that she quit smoking about 28 years ago. Her smoking use included Cigarettes. She started smoking about 29 years ago. She quit after 1.00 year of use. She has never used smokeless tobacco.      Counseling Resources:  ATP IV Guidelines  Pooled Cohorts Equation Calculator  Breast Cancer Risk Calculator  FRAX Risk Assessment  ICSI Preventive Guidelines  Dietary Guidelines for Americans, 2010  USDA's MyPlate  ASA Prophylaxis  Lung CA Screening    The information in this document, created by the medical scribe for me, accurately reflects the services I personally performed and the decisions made by me. I have reviewed and approved this document for accuracy prior to leaving the patient care area.    [facility] ROGERSPatient notified, increasing GLyburide to 5 mg twice/day, recommednign referral to diabetic educatio

## 2018-09-11 ENCOUNTER — OFFICE VISIT (OUTPATIENT)
Dept: FAMILY MEDICINE | Facility: CLINIC | Age: 38
End: 2018-09-11
Payer: COMMERCIAL

## 2018-09-11 ENCOUNTER — TELEPHONE (OUTPATIENT)
Dept: FAMILY MEDICINE | Facility: CLINIC | Age: 38
End: 2018-09-11

## 2018-09-11 VITALS
HEART RATE: 87 BPM | TEMPERATURE: 97.8 F | DIASTOLIC BLOOD PRESSURE: 84 MMHG | HEIGHT: 60 IN | OXYGEN SATURATION: 99 % | RESPIRATION RATE: 14 BRPM | WEIGHT: 211.8 LBS | BODY MASS INDEX: 41.58 KG/M2 | SYSTOLIC BLOOD PRESSURE: 118 MMHG

## 2018-09-11 DIAGNOSIS — E11.9 TYPE 2 DIABETES MELLITUS WITH HEMOGLOBIN A1C GOAL OF LESS THAN 7.0% (H): Chronic | ICD-10-CM

## 2018-09-11 DIAGNOSIS — Z12.31 ENCOUNTER FOR SCREENING MAMMOGRAM FOR BREAST CANCER: ICD-10-CM

## 2018-09-11 DIAGNOSIS — E66.01 MORBID OBESITY WITH BMI OF 40.0-44.9, ADULT (H): Chronic | ICD-10-CM

## 2018-09-11 DIAGNOSIS — Z00.00 ENCOUNTER FOR ROUTINE ADULT HEALTH EXAMINATION WITHOUT ABNORMAL FINDINGS: Primary | ICD-10-CM

## 2018-09-11 LAB
ANION GAP SERPL CALCULATED.3IONS-SCNC: 6 MMOL/L (ref 3–14)
BUN SERPL-MCNC: 16 MG/DL (ref 7–30)
CALCIUM SERPL-MCNC: 9 MG/DL (ref 8.5–10.1)
CHLORIDE SERPL-SCNC: 101 MMOL/L (ref 94–109)
CO2 SERPL-SCNC: 28 MMOL/L (ref 20–32)
CREAT SERPL-MCNC: 0.63 MG/DL (ref 0.52–1.04)
GFR SERPL CREATININE-BSD FRML MDRD: >90 ML/MIN/1.7M2
GLUCOSE SERPL-MCNC: 185 MG/DL (ref 70–99)
HBA1C MFR BLD: 8.5 % (ref 0–5.6)
POTASSIUM SERPL-SCNC: 4 MMOL/L (ref 3.4–5.3)
SODIUM SERPL-SCNC: 135 MMOL/L (ref 133–144)

## 2018-09-11 PROCEDURE — 36415 COLL VENOUS BLD VENIPUNCTURE: CPT | Performed by: NURSE PRACTITIONER

## 2018-09-11 PROCEDURE — 99214 OFFICE O/P EST MOD 30 MIN: CPT | Mod: 25 | Performed by: NURSE PRACTITIONER

## 2018-09-11 PROCEDURE — 99395 PREV VISIT EST AGE 18-39: CPT | Performed by: NURSE PRACTITIONER

## 2018-09-11 PROCEDURE — 80048 BASIC METABOLIC PNL TOTAL CA: CPT | Performed by: NURSE PRACTITIONER

## 2018-09-11 PROCEDURE — 83036 HEMOGLOBIN GLYCOSYLATED A1C: CPT | Performed by: NURSE PRACTITIONER

## 2018-09-11 RX ORDER — GLYBURIDE 2.5 MG/1
TABLET ORAL
Refills: 0 | COMMUNITY
Start: 2018-07-17 | End: 2018-09-11

## 2018-09-11 RX ORDER — LISINOPRIL 5 MG/1
TABLET ORAL
Refills: 3 | COMMUNITY
Start: 2018-02-07

## 2018-09-11 RX ORDER — GLYBURIDE 5 MG/1
5 TABLET ORAL 2 TIMES DAILY WITH MEALS
Qty: 180 TABLET | Refills: 0 | Status: SHIPPED | OUTPATIENT
Start: 2018-09-11 | End: 2019-01-09

## 2018-09-11 RX ORDER — SIMVASTATIN 40 MG
40 TABLET ORAL
Refills: 1 | COMMUNITY
Start: 2018-02-07 | End: 2019-07-08

## 2018-09-11 ASSESSMENT — ANXIETY QUESTIONNAIRES
GAD7 TOTAL SCORE: 2
7. FEELING AFRAID AS IF SOMETHING AWFUL MIGHT HAPPEN: NOT AT ALL
3. WORRYING TOO MUCH ABOUT DIFFERENT THINGS: NOT AT ALL
4. TROUBLE RELAXING: NOT AT ALL
1. FEELING NERVOUS, ANXIOUS, OR ON EDGE: SEVERAL DAYS
GAD7 TOTAL SCORE: 2
5. BEING SO RESTLESS THAT IT IS HARD TO SIT STILL: SEVERAL DAYS
2. NOT BEING ABLE TO STOP OR CONTROL WORRYING: NOT AT ALL
7. FEELING AFRAID AS IF SOMETHING AWFUL MIGHT HAPPEN: NOT AT ALL
6. BECOMING EASILY ANNOYED OR IRRITABLE: NOT AT ALL
GAD7 TOTAL SCORE: 2

## 2018-09-11 ASSESSMENT — PAIN SCALES - GENERAL: PAINLEVEL: NO PAIN (0)

## 2018-09-11 NOTE — TELEPHONE ENCOUNTER
Pt brought form from [employer] into [facility] today    KL completed forms.    Left message asking patient to return call - we are unable to mail the forms to the [employer] address.  Patient needs to sign the back of the form first.    Placed forms at  for pt to come in and sign.

## 2018-09-11 NOTE — MR AVS SNAPSHOT
After Visit Summary   9/11/2018    Mark Quick    MRN: [id number]           Patient Information     Date Of Birth          1980        Visit Information        Provider Department      9/11/2018 8:20 AM Adriana Wade APRN CNP [facility]        Today's Diagnoses     Encounter for screening mammogram for breast cancer    -  1    Type 2 diabetes mellitus with hemoglobin A1c goal of less than 7.0% (H)           Follow-ups after your visit        Future tests that were ordered for you today     Open Future Orders        Priority Expected Expires Ordered    MA Screening Digital Bilateral Routine  9/11/2019 9/11/2018            Who to contact     If you have questions or need follow up information about today's clinic visit or your schedule please contact [facility] directly at [phone].  Normal or non-critical lab and imaging results will be communicated to you by MyChart, letter or phone within 4 business days after the clinic has received the results. If you do not hear from us within 7 days, please contact the clinic through [facility] or phone. If you have a critical or abnormal lab result, we will notify you by phone as soon as possible.  Submit refill requests through [employer] or call your pharmacy and they will forward the refill request to us. Please allow 3 business days for your refill to be completed.          Additional Information About Your Visit        MyChart Information     [employer] gives you secure access to your electronic health record. If you see a primary care provider, you can also send messages to your care team and make appointments. If you have questions, please call your primary care clinic.  If you do not have a primary care provider, please call [phone] and they will assist you.        Care EveryWhere ID     This is your Care EveryWhere ID. This could be used by other organizations to access your [place] medical records  [id number]       "  Your Vitals Were     Pulse Temperature Respirations Height Last Period Pulse Oximetry    87 97.8  F (36.6  C) (Temporal) 14 4' 11.5\" (1.511 m) 08/31/2018 (Exact Date) 99%    BMI (Body Mass Index)                   42.06 kg/m2            Blood Pressure from Last 3 Encounters:   09/11/18 118/84   05/04/18 122/78   12/19/17 114/68    Weight from Last 3 Encounters:   09/11/18 211 lb 12.8 oz (96.1 kg)   05/04/18 212 lb 8 oz (96.4 kg)   12/19/17 217 lb 12.8 oz (98.8 kg)              We Performed the Following     Basic metabolic panel     Hemoglobin A1c          Today's Medication Changes          These changes are accurate as of 9/11/18  9:10 AM.  If you have any questions, ask your nurse or doctor.               Stop taking these medicines if you haven't already. Please contact your care team if you have questions.     ACE/ARB/ARNI NOT PRESCRIBED (INTENTIONAL)   Stopped by:  Adriana Wade APRN CNP                Where to get your medicines      These medications were sent to [facility]/pharmacy #[id number] - [place], [place] - [address]  [address] , [place] [address]     Phone:  [phone]     dulaglutide 1.5 MG/0.5ML pen                Primary Care Provider Office Phone # Fax #    OBEY Cabello [phone] [address] [facility] [address]        Equal Access to Services     [facility] : Hadii caleb ku hadasho Soomaali, waaxda luqadaha, qaybta kaalmada adeegyada, waxay [place] adesondra burris . So [facility] [phone].    ATENCIÓN: Si habla español, tiene a chew disposición servicios gratuitos de asistencia lingüística. Lisa al [phone].    We comply with applicable federal civil rights laws and [place] laws. We do not discriminate on the basis of race, color, national origin, age, disability, sex, sexual orientation, or gender identity.            Thank you!     Thank you for choosing [facility]  for your care. Our goal is always to provide you " with excellent care. Hearing back from our patients is one way we can continue to improve our services. Please take a few minutes to complete the written survey that you may receive in the mail after your visit with us. Thank you!             Your Updated Medication List - Protect others around you: Learn how to safely use, store and throw away your medicines at www.disposemymeds.org.          This list is accurate as of 9/11/18  9:10 AM.  Always use your most recent med list.                   Brand Name Dispense Instructions for use Diagnosis    ASPIRIN NOT PRESCRIBED    INTENTIONAL     continuous prn for other Antiplatelet medication not prescribed intentionally due to Not indicated based on age        blood glucose monitoring meter device kit    no brand specified    1 kit    Use to test blood sugar four times daily or as directed.  Get meter that is covered by insurance    Type II or unspecified type diabetes mellitus without mention of complication, not stated as uncontrolled       blood glucose monitoring test strip    no brand specified    12 Box    Use to test blood sugar four times daily or as directed. One touch ultra mini system    Type 2 diabetes mellitus with complication (H)       CALCIUM + D PO      Reported on 5/8/2017        dulaglutide 1.5 MG/0.5ML pen    TRULICITY    2 mL    Inject 1.5 mg Subcutaneous every 7 days    Type 2 diabetes mellitus with hemoglobin A1c goal of less than 7.0% (H)       FLINTSTONES COMPLETE PO           glyBURIDE 2.5 MG tablet    DIABETA /MICRONASE     TAKE 1 TABLET (2.5 MG) BY MOUTH DAILY (WITH BREAKFAST)        IBUPROFEN PO           lisinopril 5 MG tablet    PRINIVIL/ZESTRIL     TAKE 1 TABLET (5 MG) BY MOUTH DAILY        nystatin-triamcinolone cream    MYCOLOG II    30 g    Apply to affected area BID up to 7 days prn rash    Candidal intertrigo       simvastatin 40 MG tablet    ZOCOR     Take 40 mg by mouth

## 2018-09-12 ASSESSMENT — ANXIETY QUESTIONNAIRES: GAD7 TOTAL SCORE: 2

## 2018-09-12 ASSESSMENT — PATIENT HEALTH QUESTIONNAIRE - PHQ9: SUM OF ALL RESPONSES TO PHQ QUESTIONS 1-9: 0

## 2018-09-14 ENCOUNTER — TELEPHONE (OUTPATIENT)
Dept: PHARMACY | Facility: OTHER | Age: 38
End: 2018-09-14

## 2018-09-14 NOTE — TELEPHONE ENCOUNTER
MTM referral from: [facility] visit (referral by provider)    MTM referral outreach attempt #2 on September 14, 2018 at 4:12 PM      Outcome: Patient not reachable after several attempts, will route to MTM Pharmacist/Provider as an FYI. Thank you for the referral.    Conchis Gonzalez, MTM Coordinator

## 2018-09-23 DIAGNOSIS — E11.9 TYPE 2 DIABETES MELLITUS WITH HEMOGLOBIN A1C GOAL OF LESS THAN 7.0% (H): Chronic | ICD-10-CM

## 2018-09-24 RX ORDER — DULAGLUTIDE 1.5 MG/.5ML
INJECTION, SOLUTION SUBCUTANEOUS
Refills: 1 | OUTPATIENT
Start: 2018-09-24

## 2018-09-24 NOTE — TELEPHONE ENCOUNTER
"Requested Prescriptions   Pending Prescriptions Disp Refills     TRULICITY 1.5 MG/0.5ML pen [Pharmacy Med Name: TRULICITY 1.5 MG/0.5 ML PEN]  1     Sig: INJECT 1.5 MG SUBCUTANEOUS EVERY 7 DAYS    GLP-1 Agonists Protocol Passed    9/23/2018  1:39 AM       Passed - Blood pressure less than 140/90 in past 6 months    BP Readings from Last 3 Encounters:   09/11/18 118/84   05/04/18 122/78   12/19/17 114/68          Passed - LDL on file in past 12 months    Recent Labs   Lab Test  05/07/18   1058   LDL  108*          Passed - Microalbumin on file in past 12 months    Recent Labs   Lab Test  05/07/18   1058   MICROL  <5   UMALCR  Unable to calculate due to low value          Passed - HgbA1C in past 3 or 6 months    If HgbA1C is 8 or greater, it needs to be on file within the past 3 months.  If less than 8, must be on file within the past 6 months.     Recent Labs   Lab Test  09/11/18   0913   A1C  8.5*          Passed - Patient is age 18 or older       Passed - No active pregnancy on record       Passed - Normal serum creatinine on file in past 12 months    Recent Labs   Lab Test  09/11/18   0913   CR  0.63          Passed - No positive pregnancy test in past 12 months       Passed - Recent (6 mo) or future (30 days) visit within the authorizing provider's specialty    Patient had office visit in the last 6 months or has a visit in the next 30 days with authorizing provider.  See \"Patient Info\" tab in inbasket, or \"Choose Columns\" in Meds & Orders section of the refill encounter.            dulaglutide (TRULICITY) 1.5 MG/0.5ML pen  Rx was sent 09/11/2018 for 2mL and 6 refills.   Pharmacy notified via E-prescribe refusal.  Christina Beckford, RN, BSN       "

## 2018-10-01 ENCOUNTER — TELEPHONE (OUTPATIENT)
Dept: LAB | Facility: CLINIC | Age: 38
End: 2018-10-01

## 2018-10-01 DIAGNOSIS — E11.9 TYPE 2 DIABETES MELLITUS WITH HEMOGLOBIN A1C GOAL OF LESS THAN 7.0% (H): Chronic | ICD-10-CM

## 2018-10-01 NOTE — TELEPHONE ENCOUNTER
TRULICITY INJ 1.5/0.5   Date last filled: 8/27/18  Qty. Prescribed: 2  Refills: 1  Date written: 6/27/18  SIG: inject 1.5 MG subcutaneous every 7 days    [facility]  [address] [place] [address]

## 2018-10-16 ENCOUNTER — RADIANT APPOINTMENT (OUTPATIENT)
Dept: MAMMOGRAPHY | Facility: CLINIC | Age: 38
End: 2018-10-16
Attending: NURSE PRACTITIONER
Payer: COMMERCIAL

## 2018-10-16 DIAGNOSIS — Z12.31 ENCOUNTER FOR SCREENING MAMMOGRAM FOR BREAST CANCER: ICD-10-CM

## 2018-10-16 PROCEDURE — 77067 SCR MAMMO BI INCL CAD: CPT

## 2018-12-19 ENCOUNTER — TELEPHONE (OUTPATIENT)
Dept: FAMILY MEDICINE | Facility: CLINIC | Age: 38
End: 2018-12-19

## 2018-12-19 NOTE — TELEPHONE ENCOUNTER
Summary:    Patient is due/failing the following:   Diabetic follow up and A1C    Action needed:   Patient needs office visit for follow up.    Type of outreach:    phone no answer or voicemail. Reminder letter sent    Questions for provider review:    None                                                                                                                                    Breana Santamaria       Chart routed to Care Team .          Panel Management Review      Patient has the following on her problem list:     Depression / Dysthymia review    Measure:  Needs PHQ-9 score of 4 or less during index window.  Administer PHQ-9 and if score is 5 or more, send encounter to provider for next steps.      PHQ-9 SCORE 12/19/2017 5/4/2018 9/11/2018   PHQ-9 Total Score - - -   PHQ-9 Total Score MyChart 2 (Minimal depression) 0 0   PHQ-9 Total Score 2 0 0       If PHQ-9 recheck is 5 or more, route to provider for next steps.    Patient is due for:  None    Diabetes    ASA: Passed    Last A1C  Lab Results   Component Value Date    A1C 8.5 09/11/2018    A1C 8.0 05/07/2018    A1C 7.6 12/19/2017    A1C 8.1 09/22/2017    A1C 6.5 05/08/2017     A1C tested: FAILED    Last LDL:    Lab Results   Component Value Date    CHOL 212 05/07/2018     Lab Results   Component Value Date    HDL 36 05/07/2018     Lab Results   Component Value Date     05/07/2018     Lab Results   Component Value Date    TRIG 338 05/07/2018     Lab Results   Component Value Date    CHOLHDLRATIO 3.7 09/30/2015     Lab Results   Component Value Date    NHDL 176 05/07/2018       Is the patient on a Statin? YES             Is the patient on Aspirin? YES    Medications     HMG CoA Reductase Inhibitors    simvastatin (ZOCOR) 40 MG tablet    Salicylates    aspirin 81 MG EC tablet          Last three blood pressure readings:  BP Readings from Last 3 Encounters:   09/11/18 118/84   05/04/18 122/78   12/19/17 114/68          Tobacco History:     History    Smoking Status     Former Smoker     Years: 1.00     Types: Cigarettes     Start date: 1/1/1989     Quit date: 1/1/1990   Smokeless Tobacco     Never Used         Hypertension   Last three blood pressure readings:  BP Readings from Last 3 Encounters:   09/11/18 118/84   05/04/18 122/78   12/19/17 114/68     Blood pressure: Passed    HTN Guidelines:  Age 18-59 BP range:  Less than 140/90  Age 60-85 with Diabetes:  Less than 140/90  Age 60-85 without Diabetes:  less than 150/90      Composite cancer screening  Chart review shows that this patient is due/due soon for the following None

## 2019-01-09 DIAGNOSIS — E11.9 TYPE 2 DIABETES MELLITUS WITH HEMOGLOBIN A1C GOAL OF LESS THAN 7.0% (H): Chronic | ICD-10-CM

## 2019-01-09 RX ORDER — GLYBURIDE 5 MG/1
TABLET ORAL
Qty: 60 TABLET | Refills: 0 | Status: SHIPPED | OUTPATIENT
Start: 2019-01-09 | End: 2019-08-02

## 2019-01-09 NOTE — TELEPHONE ENCOUNTER
"Requested Prescriptions   Pending Prescriptions Disp Refills     glyBURIDE (DIABETA /MICRONASE) 5 MG tablet [Pharmacy Med Name: GLYBURIDE 5 MG TABLET] 180 tablet 0     Sig: TAKE 1 TABLET BY MOUTH TWICE A DAY WITH MEALS    Sulfonylurea Agents Failed - 1/9/2019  1:48 AM       Failed - Patient has documented A1c within the specified period of time.    If HgbA1C is 8 or greater, it needs to be on file within the past 3 months.  If less than 8, must be on file within the past 6 months.     Recent Labs   Lab Test 09/11/18  0913   A1C 8.5*          Passed - Blood pressure less than 140/90 in past 6 months    BP Readings from Last 3 Encounters:   09/11/18 118/84   05/04/18 122/78   12/19/17 114/68          Passed - Patient has documented LDL within the past 12 mos.    Recent Labs   Lab Test 05/07/18  1058   *          Passed - Patient has had a Microalbumin in the past 12 mos.    Recent Labs   Lab Test 05/07/18  1058   MICROL <5   UMALCR Unable to calculate due to low value          Passed - Medication is active on med list       Passed - Patient is age 18 or older       Passed - No active pregnancy on record       Passed - Patient has a recent creatinine (normal) within the past 12 mos.    Recent Labs   Lab Test 09/11/18  0913   CR 0.63          Passed - Patient has not had a positive pregnancy test within the past 12 mos.       Passed - Recent (6 mo) or future (30 days) visit within the authorizing provider's specialty    Patient had office visit in the last 6 months or has a visit in the next 30 days with authorizing provider or within the authorizing provider's specialty.  See \"Patient Info\" tab in inbasket, or \"Choose Columns\" in Meds & Orders section of the refill encounter.            Last ov 09/11/2018      Medication is being filled for 1 time refill only due to:  Patient needs to be seen because due for A1c.     Please call and help schedule.      "

## 2019-02-04 NOTE — TELEPHONE ENCOUNTER
Tried contacting patient. No answer or voicemail. Previous reminder letter sent.    Breana Santamaria

## 2019-05-02 ENCOUNTER — TELEPHONE (OUTPATIENT)
Dept: FAMILY MEDICINE | Facility: CLINIC | Age: 39
End: 2019-05-02

## 2019-05-02 NOTE — LETTER
[facility]  [address], [address]  [place] [address]  Phone: [phone]  Fax: [phone]  May 2, 2019      Mark Quick  [address] [place] [address]      Dear Mark,    We care about your health and have reviewed your health plan including your medical conditions, medications, and lab results.  Based on this review, it is recommended that you follow up regarding the following health topic(s):  -Diabetes    We recommend you take the following action(s):  -schedule a FOLLOWUP OFFICE APPOINTMENT.  We will perform the following labs:  A1c, Lipids (fasting cholesterol - nothing to eat except water and/or meds for 8-10 hours), CMP(complete metabolic panel) and Microablumin.     Please call us at the [facility] - [phone] (or use [employer]) to address the above recommendations.     Thank you for trusting [facility] and we appreciate the opportunity to serve you.  We look forward to supporting your healthcare needs in the future.    Healthy Regards,    Your Health Care Team  [facility]

## 2019-07-01 ENCOUNTER — TELEPHONE (OUTPATIENT)
Dept: FAMILY MEDICINE | Facility: CLINIC | Age: 39
End: 2019-07-01

## 2019-07-08 DIAGNOSIS — E78.5 HYPERLIPIDEMIA LDL GOAL <130: Primary | Chronic | ICD-10-CM

## 2019-07-09 RX ORDER — SIMVASTATIN 40 MG
40 TABLET ORAL AT BEDTIME
Qty: 30 TABLET | Refills: 0 | Status: SHIPPED | OUTPATIENT
Start: 2019-07-09

## 2019-07-09 NOTE — TELEPHONE ENCOUNTER
Pending Prescriptions:                       Disp   Refills    simvastatin (ZOCOR) 40 MG tablet                 1            Sig: Take 1 tablet (40 mg) by mouth    Routing refill request to provider for review/approval because:  Labs not current:  lipids  Medication is reported/historical    Patsy Lyons RN, BSN

## 2019-08-02 ENCOUNTER — TELEPHONE (OUTPATIENT)
Dept: FAMILY MEDICINE | Facility: CLINIC | Age: 39
End: 2019-08-02

## 2019-08-02 DIAGNOSIS — E11.9 TYPE 2 DIABETES MELLITUS WITH HEMOGLOBIN A1C GOAL OF LESS THAN 7.0% (H): Chronic | ICD-10-CM

## 2019-08-05 RX ORDER — GLYBURIDE 5 MG/1
TABLET ORAL
Qty: 60 TABLET | Refills: 0 | Status: SHIPPED | OUTPATIENT
Start: 2019-08-05

## 2019-08-05 NOTE — TELEPHONE ENCOUNTER
Please call pt. Overdue for office visit for diabetes (needed for refills)- please assist w/scheduling. Authorizing only 30 days of refill. Nhung Brito PA-C

## 2019-08-05 NOTE — TELEPHONE ENCOUNTER
Pending Prescriptions:                       Disp   Refills    glyBURIDE (DIABETA /MICRONASE) 5 MG table*60 tab*0            Sig: TAKE 1 TABLET BY MOUTH TWICE A DAY WITH MEALS    BP Readings from Last 3 Encounters:   09/11/18 118/84   05/04/18 122/78   12/19/17 114/68     Routing refill request to provider for review/approval because:  Labs out of range:  B/P  Labs out of date: Lipids, hgbA1c, microalbumin  LOV 9/11/2018    Patsy Lyons RN, BSN

## 2019-09-04 ENCOUNTER — TELEPHONE (OUTPATIENT)
Dept: FAMILY MEDICINE | Facility: CLINIC | Age: 39
End: 2019-09-04

## 2019-09-04 NOTE — LETTER
[facility]  [address], [address]  [place] [address]  Phone: [phone]  Fax: [phone]  September 23, 2019      Mark Quick  [address] [place] [address]      Dear Mark,    We care about your health and have reviewed your health plan including your medical conditions, medications, and lab results.  Based on this review, it is recommended that you follow up regarding the following health topic(s):  -Diabetes  -Cervical Cancer Screening  -Wellness (Physical) Visit     We recommend you take the following action(s):  -schedule a WELLNESS (Physical) APPOINTMENT.  We will perform the following labs: A1c, Lipids (fasting cholesterol - nothing to eat except water and/or meds for 8-10 hours), BMP (basic metabolic panel), CMP(complete metabolic panel) and Microablumin.  -schedule a PAP SMEAR EXAM which is due.  Please disregard this reminder if you have had this exam elsewhere within the last year.  It would be helpful for us to have a copy of your recent pap smear report to update your records.     Please call us at the [facility] - [phone] (or use [employer]) to address the above recommendations.     Thank you for trusting [facility] and we appreciate the opportunity to serve you.  We look forward to supporting your healthcare needs in the future.    Healthy Regards,    Your Health Care Team  [facility]

## 2019-09-04 NOTE — TELEPHONE ENCOUNTER
Summary:    Patient is due/failing the following:   Diabetic follow up, Eye exam, Physical with PAP, A1C, LDL, BMP, Microalbumin, CMP    Action needed:   Patient needs office visit for Diabetic follow up and physical . and Patient needs fasting lab only appointment    Type of outreach:    Phone, left message for patient to call back.     Questions for provider review:    None                                                                                                                                    Breana Santamaria [facility]       Chart routed to Care Team .          Panel Management Review      Patient has the following on her problem list:     Depression / Dysthymia review    Measure:  Needs PHQ-9 score of 4 or less during index window.  Administer PHQ-9 and if score is 5 or more, send encounter to provider for next steps.        PHQ-9 SCORE 12/19/2017 5/4/2018 9/11/2018   PHQ-9 Total Score - - -   PHQ-9 Total Score MyChart 2 (Minimal depression) 0 0   PHQ-9 Total Score 2 0 0       If PHQ-9 recheck is 5 or more, route to provider for next steps.    Patient is due for:  PHQ9    Diabetes    ASA: Passed    Last A1C  Lab Results   Component Value Date    A1C 8.5 09/11/2018    A1C 8.0 05/07/2018    A1C 7.6 12/19/2017    A1C 8.1 09/22/2017    A1C 6.5 05/08/2017     A1C tested: FAILED    Last LDL:    Lab Results   Component Value Date    CHOL 212 05/07/2018     Lab Results   Component Value Date    HDL 36 05/07/2018     Lab Results   Component Value Date     05/07/2018     Lab Results   Component Value Date    TRIG 338 05/07/2018     Lab Results   Component Value Date    CHOLHDLRATIO 3.7 09/30/2015     Lab Results   Component Value Date    NHDL 176 05/07/2018       Is the patient on a Statin? YES             Is the patient on Aspirin? YES    Medications     HMG CoA Reductase Inhibitors     simvastatin (ZOCOR) 40 MG tablet       Salicylates     aspirin 81 MG EC tablet        ASPIRIN NOT PRESCRIBED (INTENTIONAL)              Last three blood pressure readings:  BP Readings from Last 3 Encounters:   09/11/18 118/84   05/04/18 122/78   12/19/17 114/68          Tobacco History:     History   Smoking Status     Former Smoker     Years: 1.00     Types: Cigarettes     Start date: 1/1/1989     Quit date: 1/1/1990   Smokeless Tobacco     Never Used           Composite cancer screening  Chart review shows that this patient is due/due soon for the following Pap Smear

## 2019-09-27 ENCOUNTER — TELEPHONE (OUTPATIENT)
Dept: FAMILY MEDICINE | Facility: CLINIC | Age: 39
End: 2019-09-27

## 2020-03-10 ENCOUNTER — HEALTH MAINTENANCE LETTER (OUTPATIENT)
Age: 40
End: 2020-03-10

## 2020-12-20 ENCOUNTER — HEALTH MAINTENANCE LETTER (OUTPATIENT)
Age: 40
End: 2020-12-20

## 2021-03-01 ENCOUNTER — VIRTUAL VISIT (OUTPATIENT)
Dept: FAMILY MEDICINE | Facility: CLINIC | Age: 41
End: 2021-03-01
Payer: COMMERCIAL

## 2021-03-01 DIAGNOSIS — B34.9 VIRAL SYNDROME: Primary | ICD-10-CM

## 2021-03-01 DIAGNOSIS — B34.9 VIRAL SYNDROME: ICD-10-CM

## 2021-03-01 LAB
SARS-COV-2 RNA RESP QL NAA+PROBE: NORMAL
SPECIMEN SOURCE: NORMAL

## 2021-03-01 PROCEDURE — U0003 INFECTIOUS AGENT DETECTION BY NUCLEIC ACID (DNA OR RNA); SEVERE ACUTE RESPIRATORY SYNDROME CORONAVIRUS 2 (SARS-COV-2) (CORONAVIRUS DISEASE [COVID-19]), AMPLIFIED PROBE TECHNIQUE, MAKING USE OF HIGH THROUGHPUT TECHNOLOGIES AS DESCRIBED BY CMS-2020-01-R: HCPCS | Performed by: FAMILY MEDICINE

## 2021-03-01 PROCEDURE — 99213 OFFICE O/P EST LOW 20 MIN: CPT | Mod: 95 | Performed by: FAMILY MEDICINE

## 2021-03-01 PROCEDURE — U0005 INFEC AGEN DETEC AMPLI PROBE: HCPCS | Performed by: FAMILY MEDICINE

## 2021-03-01 RX ORDER — ATORVASTATIN CALCIUM 10 MG/1
TABLET, FILM COATED ORAL
COMMUNITY
Start: 2020-01-02

## 2021-03-01 NOTE — PROGRESS NOTES
Mark is a 40 year old who is being evaluated via a billable telephone visit.      What phone number would you like to be contacted at? [phone]  How would you like to obtain your AVS? MyChart    Assessment & Plan     Viral syndrome  40-year-old female with viral syndrome, likely viral gastroenteritis.  Overall much improved today.  She has other illnesses in her family requiring testing for Covid.  She requires this for work.  - Symptomatic COVID-19 Virus (Coronavirus) by PCR; Future        Return in about 4 weeks (around 3/29/2021) for Annual Well Check.    Isaac Finley MD  [facility] SPARKSDESHAUN Flores is a 40 year old who presents for the following health issues     HPI       Acute Illness  Acute illness concerns: nausea, diarrhea   Onset/Duration: Friday   Symptoms:  Fever: no  Chills/Sweats: no  Headache (location?): no  Sinus Pressure: YES- slight  Conjunctivitis:  no  Ear Pain: no  Rhinorrhea: no  Congestion: no  Sore Throat: no  Cough: no  Wheeze: no  Decreased Appetite: YES  Nausea: YES  Vomiting: no  Diarrhea: YES  Dysuria/Freq.: no  Dysuria or Hematuria: no  Fatigue/Achiness: YES- achey   Sick/Strep Exposure: family  Therapies tried and outcome: pepto bismol         Review of Systems   Constitutional, HEENT, cardiovascular, pulmonary, gi and gu systems are negative, except as otherwise noted.      Objective           Vitals:  No vitals were obtained today due to virtual visit.    Physical Exam   healthy, alert and no distress  PSYCH: Alert and oriented times 3; coherent speech, normal   rate and volume, able to articulate logical thoughts, able   to abstract reason, no tangential thoughts, no hallucinations   or delusions  Her affect is normal  RESP: No cough, no audible wheezing, able to talk in full sentences  Remainder of exam unable to be completed due to telephone visits            Phone call duration: 5 minutes

## 2021-03-02 LAB
LABORATORY COMMENT REPORT: NORMAL
SARS-COV-2 RNA RESP QL NAA+PROBE: NEGATIVE
SPECIMEN SOURCE: NORMAL

## 2021-03-02 NOTE — RESULT ENCOUNTER NOTE
Please advise patient that her Covid test was negative.    Isaac Finley MD, FAAFP  Family Medicine Physician  [facility] [place]  [address] [place] [address]

## 2021-03-03 ENCOUNTER — MYC MEDICAL ADVICE (OUTPATIENT)
Dept: FAMILY MEDICINE | Facility: CLINIC | Age: 41
End: 2021-03-03

## 2021-04-24 ENCOUNTER — HEALTH MAINTENANCE LETTER (OUTPATIENT)
Age: 41
End: 2021-04-24

## 2021-08-08 ENCOUNTER — HEALTH MAINTENANCE LETTER (OUTPATIENT)
Age: 41
End: 2021-08-08

## 2021-10-03 ENCOUNTER — HEALTH MAINTENANCE LETTER (OUTPATIENT)
Age: 41
End: 2021-10-03

## 2021-10-19 PROBLEM — F32.9 MAJOR DEPRESSION: Status: ACTIVE | Noted: 2017-12-12

## 2022-03-20 ENCOUNTER — HEALTH MAINTENANCE LETTER (OUTPATIENT)
Age: 42
End: 2022-03-20

## 2022-05-15 ENCOUNTER — HEALTH MAINTENANCE LETTER (OUTPATIENT)
Age: 42
End: 2022-05-15

## 2022-09-11 ENCOUNTER — HEALTH MAINTENANCE LETTER (OUTPATIENT)
Age: 42
End: 2022-09-11

## 2023-01-22 ENCOUNTER — HEALTH MAINTENANCE LETTER (OUTPATIENT)
Age: 43
End: 2023-01-22

## 2023-06-03 ENCOUNTER — HEALTH MAINTENANCE LETTER (OUTPATIENT)
Age: 43
End: 2023-06-03

## 2023-07-23 ENCOUNTER — HEALTH MAINTENANCE LETTER (OUTPATIENT)
Age: 43
End: 2023-07-23

## 2023-12-10 ENCOUNTER — HEALTH MAINTENANCE LETTER (OUTPATIENT)
Age: 43
End: 2023-12-10

## 2024-02-18 ENCOUNTER — HEALTH MAINTENANCE LETTER (OUTPATIENT)
Age: 44
End: 2024-02-18